# Patient Record
Sex: FEMALE | Race: WHITE | NOT HISPANIC OR LATINO | URBAN - METROPOLITAN AREA
[De-identification: names, ages, dates, MRNs, and addresses within clinical notes are randomized per-mention and may not be internally consistent; named-entity substitution may affect disease eponyms.]

---

## 2017-04-18 ENCOUNTER — OFFICE VISIT - HEALTHEAST (OUTPATIENT)
Dept: FAMILY MEDICINE | Facility: CLINIC | Age: 27
End: 2017-04-18

## 2017-04-18 DIAGNOSIS — E05.00 GRAVES DISEASE: ICD-10-CM

## 2017-04-18 DIAGNOSIS — Z76.89 ESTABLISHING CARE WITH NEW DOCTOR, ENCOUNTER FOR: ICD-10-CM

## 2017-04-18 DIAGNOSIS — M62.89 PELVIC FLOOR DYSFUNCTION: ICD-10-CM

## 2017-04-18 DIAGNOSIS — Z97.5 IUD (INTRAUTERINE DEVICE) IN PLACE: ICD-10-CM

## 2017-04-18 DIAGNOSIS — R23.3 EASY BRUISING: ICD-10-CM

## 2017-04-18 DIAGNOSIS — N89.8 VAGINAL DISCHARGE: ICD-10-CM

## 2017-04-18 DIAGNOSIS — E05.90 HYPERTHYROIDISM: ICD-10-CM

## 2017-04-18 DIAGNOSIS — Z01.419 WELL WOMAN EXAM: ICD-10-CM

## 2017-04-18 DIAGNOSIS — N92.0 MENORRHAGIA DUE TO INTRAUTERINE DEVICE (IUD) (H): ICD-10-CM

## 2017-04-18 DIAGNOSIS — T83.89XA MENORRHAGIA DUE TO INTRAUTERINE DEVICE (IUD) (H): ICD-10-CM

## 2017-04-18 ASSESSMENT — MIFFLIN-ST. JEOR: SCORE: 1283.08

## 2017-04-19 LAB
CHOLEST SERPL-MCNC: 229 MG/DL
HDLC SERPL-MCNC: 64 MG/DL
LDLC SERPL CALC-MCNC: 152 MG/DL
TRIGL SERPL-MCNC: 64 MG/DL

## 2017-04-20 ENCOUNTER — COMMUNICATION - HEALTHEAST (OUTPATIENT)
Dept: FAMILY MEDICINE | Facility: CLINIC | Age: 27
End: 2017-04-20

## 2017-04-20 ENCOUNTER — RECORDS - HEALTHEAST (OUTPATIENT)
Dept: ADMINISTRATIVE | Facility: OTHER | Age: 27
End: 2017-04-20

## 2017-04-20 DIAGNOSIS — T83.89XA MENORRHAGIA DUE TO INTRAUTERINE DEVICE (IUD) (H): ICD-10-CM

## 2017-04-20 DIAGNOSIS — N92.0 MENORRHAGIA DUE TO INTRAUTERINE DEVICE (IUD) (H): ICD-10-CM

## 2017-04-27 LAB
BKR LAB AP ABNORMAL BLEEDING: YES
BKR LAB AP BIRTH CONTROL/HORMONES: NORMAL
BKR LAB AP CERVICAL APPEARANCE: NORMAL
BKR LAB AP GYN ADEQUACY: NORMAL
BKR LAB AP GYN INTERPRETATION: NORMAL
BKR LAB AP GYN OTHER FINDINGS: NORMAL
BKR LAB AP HPV REFLEX: NORMAL
BKR LAB AP LMP: NORMAL
BKR LAB AP PATIENT STATUS: NORMAL
BKR LAB AP PREVIOUS ABNORMAL: NORMAL
BKR LAB AP PREVIOUS NORMAL: NORMAL
HIGH RISK?: NO
PATH REPORT.COMMENTS IMP SPEC: NORMAL
RESULT FLAG (HE HISTORICAL CONVERSION): NORMAL

## 2017-04-28 ENCOUNTER — COMMUNICATION - HEALTHEAST (OUTPATIENT)
Dept: FAMILY MEDICINE | Facility: CLINIC | Age: 27
End: 2017-04-28

## 2017-05-24 ENCOUNTER — HOSPITAL ENCOUNTER (OUTPATIENT)
Dept: ULTRASOUND IMAGING | Facility: CLINIC | Age: 27
Discharge: HOME | End: 2017-05-24
Attending: FAMILY MEDICINE

## 2017-05-24 DIAGNOSIS — N92.0 MENORRHAGIA DUE TO INTRAUTERINE DEVICE (IUD) (H): ICD-10-CM

## 2017-05-24 DIAGNOSIS — T83.89XA MENORRHAGIA DUE TO INTRAUTERINE DEVICE (IUD) (H): ICD-10-CM

## 2017-05-25 ENCOUNTER — COMMUNICATION - HEALTHEAST (OUTPATIENT)
Dept: FAMILY MEDICINE | Facility: CLINIC | Age: 27
End: 2017-05-25

## 2017-05-30 ENCOUNTER — COMMUNICATION - HEALTHEAST (OUTPATIENT)
Dept: FAMILY MEDICINE | Facility: CLINIC | Age: 27
End: 2017-05-30

## 2017-05-30 ENCOUNTER — OFFICE VISIT - HEALTHEAST (OUTPATIENT)
Dept: FAMILY MEDICINE | Facility: CLINIC | Age: 27
End: 2017-05-30

## 2017-05-30 DIAGNOSIS — Z30.9 CONTRACEPTION MANAGEMENT: ICD-10-CM

## 2017-05-30 DIAGNOSIS — D64.9 ANEMIA: ICD-10-CM

## 2017-05-30 DIAGNOSIS — Z30.432 ENCOUNTER FOR IUD REMOVAL: ICD-10-CM

## 2017-05-30 DIAGNOSIS — N92.0 MENORRHAGIA DUE TO INTRAUTERINE DEVICE (IUD) (H): ICD-10-CM

## 2017-05-30 DIAGNOSIS — T83.89XA MENORRHAGIA DUE TO INTRAUTERINE DEVICE (IUD) (H): ICD-10-CM

## 2017-07-27 ENCOUNTER — COMMUNICATION - HEALTHEAST (OUTPATIENT)
Dept: FAMILY MEDICINE | Facility: CLINIC | Age: 27
End: 2017-07-27

## 2017-07-27 DIAGNOSIS — E05.00 GRAVES DISEASE: ICD-10-CM

## 2017-07-28 ENCOUNTER — AMBULATORY - HEALTHEAST (OUTPATIENT)
Dept: LAB | Facility: CLINIC | Age: 27
End: 2017-07-28

## 2017-07-28 DIAGNOSIS — E05.00 GRAVES DISEASE: ICD-10-CM

## 2017-07-30 ENCOUNTER — COMMUNICATION - HEALTHEAST (OUTPATIENT)
Dept: FAMILY MEDICINE | Facility: CLINIC | Age: 27
End: 2017-07-30

## 2017-07-30 DIAGNOSIS — R00.2 HEART PALPITATIONS: ICD-10-CM

## 2017-07-30 DIAGNOSIS — E05.00 GRAVES DISEASE: ICD-10-CM

## 2017-08-01 ENCOUNTER — COMMUNICATION - HEALTHEAST (OUTPATIENT)
Dept: ENDOCRINOLOGY | Facility: CLINIC | Age: 27
End: 2017-08-01

## 2017-08-01 ENCOUNTER — AMBULATORY - HEALTHEAST (OUTPATIENT)
Dept: ENDOCRINOLOGY | Facility: CLINIC | Age: 27
End: 2017-08-01

## 2017-08-01 DIAGNOSIS — E05.00 GRAVES DISEASE: ICD-10-CM

## 2017-08-04 ENCOUNTER — COMMUNICATION - HEALTHEAST (OUTPATIENT)
Dept: FAMILY MEDICINE | Facility: CLINIC | Age: 27
End: 2017-08-04

## 2017-08-10 ENCOUNTER — COMMUNICATION - HEALTHEAST (OUTPATIENT)
Dept: HEALTH INFORMATION MANAGEMENT | Facility: CLINIC | Age: 27
End: 2017-08-10

## 2017-10-02 ENCOUNTER — AMBULATORY - HEALTHEAST (OUTPATIENT)
Dept: NURSING | Facility: CLINIC | Age: 27
End: 2017-10-02

## 2017-10-15 ENCOUNTER — COMMUNICATION - HEALTHEAST (OUTPATIENT)
Dept: FAMILY MEDICINE | Facility: CLINIC | Age: 27
End: 2017-10-15

## 2017-12-04 ENCOUNTER — COMMUNICATION - HEALTHEAST (OUTPATIENT)
Dept: ENDOCRINOLOGY | Facility: CLINIC | Age: 27
End: 2017-12-04

## 2017-12-04 ENCOUNTER — OFFICE VISIT - HEALTHEAST (OUTPATIENT)
Dept: ENDOCRINOLOGY | Facility: CLINIC | Age: 27
End: 2017-12-04

## 2017-12-04 DIAGNOSIS — E05.90 HYPERTHYROIDISM: ICD-10-CM

## 2017-12-04 LAB
CREAT SERPL-MCNC: 0.79 MG/DL (ref 0.6–1.1)
GFR SERPL CREATININE-BSD FRML MDRD: >60 ML/MIN/1.73M2

## 2017-12-04 ASSESSMENT — MIFFLIN-ST. JEOR: SCORE: 1332.98

## 2017-12-05 ENCOUNTER — COMMUNICATION - HEALTHEAST (OUTPATIENT)
Dept: ENDOCRINOLOGY | Facility: CLINIC | Age: 27
End: 2017-12-05

## 2017-12-08 ENCOUNTER — HOSPITAL ENCOUNTER (OUTPATIENT)
Dept: ULTRASOUND IMAGING | Facility: CLINIC | Age: 27
Discharge: HOME | End: 2017-12-08
Attending: INTERNAL MEDICINE

## 2017-12-08 DIAGNOSIS — E05.90 HYPERTHYROIDISM: ICD-10-CM

## 2018-01-04 ENCOUNTER — HOSPITAL ENCOUNTER (OUTPATIENT)
Dept: NUCLEAR MEDICINE | Facility: CLINIC | Age: 28
Discharge: HOME | End: 2018-01-04
Attending: INTERNAL MEDICINE

## 2018-01-04 DIAGNOSIS — E05.90 HYPERTHYROIDISM: ICD-10-CM

## 2018-01-05 ENCOUNTER — HOSPITAL ENCOUNTER (OUTPATIENT)
Dept: NUCLEAR MEDICINE | Facility: CLINIC | Age: 28
Discharge: HOME | End: 2018-01-05
Attending: INTERNAL MEDICINE

## 2018-01-05 ENCOUNTER — AMBULATORY - HEALTHEAST (OUTPATIENT)
Dept: LAB | Facility: CLINIC | Age: 28
End: 2018-01-05

## 2018-01-05 DIAGNOSIS — E05.90 HYPERTHYROIDISM: ICD-10-CM

## 2018-01-05 LAB
HCG UR QL: NEGATIVE
SP GR UR STRIP: 1 (ref 1–1.03)

## 2018-05-14 ENCOUNTER — COMMUNICATION - HEALTHEAST (OUTPATIENT)
Dept: LAB | Facility: CLINIC | Age: 28
End: 2018-05-14

## 2018-05-14 ENCOUNTER — AMBULATORY - HEALTHEAST (OUTPATIENT)
Dept: ENDOCRINOLOGY | Facility: CLINIC | Age: 28
End: 2018-05-14

## 2018-05-14 DIAGNOSIS — E05.00 GRAVES DISEASE: ICD-10-CM

## 2018-05-17 ENCOUNTER — AMBULATORY - HEALTHEAST (OUTPATIENT)
Dept: LAB | Facility: CLINIC | Age: 28
End: 2018-05-17

## 2018-05-17 ENCOUNTER — COMMUNICATION - HEALTHEAST (OUTPATIENT)
Dept: FAMILY MEDICINE | Facility: CLINIC | Age: 28
End: 2018-05-17

## 2018-05-17 ENCOUNTER — COMMUNICATION - HEALTHEAST (OUTPATIENT)
Dept: SCHEDULING | Facility: CLINIC | Age: 28
End: 2018-05-17

## 2018-05-17 DIAGNOSIS — E03.9 HYPOTHYROIDISM: ICD-10-CM

## 2018-05-17 DIAGNOSIS — E05.00 GRAVES DISEASE: ICD-10-CM

## 2018-05-17 LAB
T3 SERPL-MCNC: <25 NG/DL (ref 45–175)
T4 FREE SERPL-MCNC: <0.4 NG/DL (ref 0.7–1.8)
TSH SERPL DL<=0.005 MIU/L-ACNC: 74.6 UIU/ML (ref 0.3–5)

## 2018-05-18 ENCOUNTER — COMMUNICATION - HEALTHEAST (OUTPATIENT)
Dept: ENDOCRINOLOGY | Facility: CLINIC | Age: 28
End: 2018-05-18

## 2018-05-21 ENCOUNTER — COMMUNICATION - HEALTHEAST (OUTPATIENT)
Dept: ENDOCRINOLOGY | Facility: CLINIC | Age: 28
End: 2018-05-21

## 2018-05-29 ENCOUNTER — COMMUNICATION - HEALTHEAST (OUTPATIENT)
Dept: FAMILY MEDICINE | Facility: CLINIC | Age: 28
End: 2018-05-29

## 2018-05-29 ENCOUNTER — COMMUNICATION - HEALTHEAST (OUTPATIENT)
Dept: SCHEDULING | Facility: CLINIC | Age: 28
End: 2018-05-29

## 2018-06-04 ENCOUNTER — OFFICE VISIT - HEALTHEAST (OUTPATIENT)
Dept: ENDOCRINOLOGY | Facility: CLINIC | Age: 28
End: 2018-06-04

## 2018-06-04 DIAGNOSIS — E05.00 GRAVES DISEASE: ICD-10-CM

## 2018-06-04 ASSESSMENT — MIFFLIN-ST. JEOR: SCORE: 1347.04

## 2018-07-10 ENCOUNTER — COMMUNICATION - HEALTHEAST (OUTPATIENT)
Dept: FAMILY MEDICINE | Facility: CLINIC | Age: 28
End: 2018-07-10

## 2018-08-20 ENCOUNTER — AMBULATORY - HEALTHEAST (OUTPATIENT)
Dept: LAB | Facility: CLINIC | Age: 28
End: 2018-08-20

## 2018-08-20 ENCOUNTER — COMMUNICATION - HEALTHEAST (OUTPATIENT)
Dept: LAB | Facility: CLINIC | Age: 28
End: 2018-08-20

## 2018-08-20 DIAGNOSIS — E05.00 GRAVES DISEASE: ICD-10-CM

## 2018-08-20 LAB
T4 FREE SERPL-MCNC: 1.3 NG/DL (ref 0.7–1.8)
TSH SERPL DL<=0.005 MIU/L-ACNC: 2.6 UIU/ML (ref 0.3–5)

## 2018-09-17 ENCOUNTER — AMBULATORY - HEALTHEAST (OUTPATIENT)
Dept: NURSING | Facility: CLINIC | Age: 28
End: 2018-09-17

## 2018-11-24 ENCOUNTER — COMMUNICATION - HEALTHEAST (OUTPATIENT)
Dept: ENDOCRINOLOGY | Facility: CLINIC | Age: 28
End: 2018-11-24

## 2018-11-24 DIAGNOSIS — E05.00 GRAVES DISEASE: ICD-10-CM

## 2018-12-04 ENCOUNTER — COMMUNICATION - HEALTHEAST (OUTPATIENT)
Dept: ENDOCRINOLOGY | Facility: CLINIC | Age: 28
End: 2018-12-04

## 2018-12-04 DIAGNOSIS — E05.00 GRAVES DISEASE: ICD-10-CM

## 2018-12-18 ENCOUNTER — COMMUNICATION - HEALTHEAST (OUTPATIENT)
Dept: ENDOCRINOLOGY | Facility: CLINIC | Age: 28
End: 2018-12-18

## 2018-12-18 DIAGNOSIS — E05.00 GRAVES DISEASE: ICD-10-CM

## 2018-12-30 ENCOUNTER — COMMUNICATION - HEALTHEAST (OUTPATIENT)
Dept: TELEHEALTH | Facility: CLINIC | Age: 28
End: 2018-12-30

## 2018-12-31 ENCOUNTER — AMBULATORY - HEALTHEAST (OUTPATIENT)
Dept: ENDOCRINOLOGY | Facility: CLINIC | Age: 28
End: 2018-12-31

## 2018-12-31 DIAGNOSIS — E05.00 GRAVES DISEASE: ICD-10-CM

## 2019-01-01 ENCOUNTER — COMMUNICATION - HEALTHEAST (OUTPATIENT)
Dept: SCHEDULING | Facility: CLINIC | Age: 29
End: 2019-01-01

## 2019-01-02 ENCOUNTER — OFFICE VISIT - HEALTHEAST (OUTPATIENT)
Dept: FAMILY MEDICINE | Facility: CLINIC | Age: 29
End: 2019-01-02

## 2019-01-02 DIAGNOSIS — L98.9 SKIN LESION: ICD-10-CM

## 2019-01-02 DIAGNOSIS — L50.1 IDIOPATHIC URTICARIA: ICD-10-CM

## 2019-01-02 DIAGNOSIS — Z00.00 ENCOUNTER FOR PREVENTIVE CARE: ICD-10-CM

## 2019-01-02 DIAGNOSIS — E05.00 GRAVES DISEASE: ICD-10-CM

## 2019-01-02 LAB
T4 FREE SERPL-MCNC: 1.1 NG/DL (ref 0.7–1.8)
TSH SERPL DL<=0.005 MIU/L-ACNC: 12.28 UIU/ML (ref 0.3–5)

## 2019-01-02 ASSESSMENT — MIFFLIN-ST. JEOR: SCORE: 1346.58

## 2019-01-02 NOTE — ASSESSMENT & PLAN NOTE
She tells me that she had her thyroid ablated and is now on thyroid hormone replacement therapy.  She is currently taking levothyroxine 125 mcg orally per day.  Her last TSH was normal.  She would like to follow with me as long as her thyroid levels are staying normal.  She had seen Dr. Perez in the past but because his schedule is really difficult to get in-she would prefer to come here.    TSH is ordered today.

## 2019-01-02 NOTE — ASSESSMENT & PLAN NOTE
There is an 8 mm oval scaly lesion noted in the right groin fold.  At this point we discussed the option of biopsy versus watchful waiting.  She prefers to watch and wait and we can biopsy if it is not resolving or if it worsens.

## 2019-01-02 NOTE — ASSESSMENT & PLAN NOTE
Pathophysiology of idiopathic urticaria was discussed today.  We discussed that itching the skin will worsen this condition.  Also if the skin is dry this will worsen the condition.    Discussed the pathophysiology of eczema and importance of keeping the skin hydrated.    Step 1-change cleanser to a non-foaming cleanser such as Cetaphil or cerave    Step 2-apply Aquaphor ointment within 3 minutes of bathing and twice daily    Step 3- can use otc steroid cream for intermittent use if needed.    Step 4 - Claritin if needed. she was educated on mechanism of action and that it may take two weeks to see full effect due to circulating histamines.       Step 5 - medrol dose pack prescribed today due to irresistible pruritus described that the patient today.    If this is not resolving she can let me know and we can refer her to dermatology.

## 2019-01-02 NOTE — ASSESSMENT & PLAN NOTE
It was recommended today that she schedule an annual exam as she is overdue.  She also requested that she switch PCPs to me.

## 2019-01-03 ENCOUNTER — COMMUNICATION - HEALTHEAST (OUTPATIENT)
Dept: FAMILY MEDICINE | Facility: CLINIC | Age: 29
End: 2019-01-03

## 2019-01-03 DIAGNOSIS — E05.00 GRAVES DISEASE: ICD-10-CM

## 2019-01-05 ENCOUNTER — COMMUNICATION - HEALTHEAST (OUTPATIENT)
Dept: TELEHEALTH | Facility: CLINIC | Age: 29
End: 2019-01-05

## 2019-01-06 ENCOUNTER — COMMUNICATION - HEALTHEAST (OUTPATIENT)
Dept: FAMILY MEDICINE | Facility: CLINIC | Age: 29
End: 2019-01-06

## 2019-01-08 ENCOUNTER — OFFICE VISIT - HEALTHEAST (OUTPATIENT)
Dept: FAMILY MEDICINE | Facility: CLINIC | Age: 29
End: 2019-01-08

## 2019-01-08 DIAGNOSIS — L50.1 IDIOPATHIC URTICARIA: ICD-10-CM

## 2019-01-08 DIAGNOSIS — E05.00 GRAVES DISEASE: ICD-10-CM

## 2019-01-08 NOTE — ASSESSMENT & PLAN NOTE
The following was reiterated: Pathophysiology of idiopathic urticaria was discussed today.  We discussed that itching the skin will worsen this condition.  Also if the skin is dry this will worsen the condition.     Discussed the pathophysiology of eczema and importance of keeping the skin hydrated.  I do wonder whether her hypothyroidism is causing her skin to be drier than usual and more sensitive.     Step 1-change cleanser to a non-foaming cleanser such as Cetaphil or cerave     Step 2-apply Aquaphor ointment within 3 minutes of bathing and twice daily     Step 3- can use otc steroid cream for intermittent use if needed.     Step 4 - Claritin if needed. she was educated on mechanism of action and that it may take two weeks to see full effect due to circulating histamines.        Step 5 -oral steroids if needed     At this point she was also offered dermatology or allergy referral.  She says she did develop an allergy to apples suddenly some years ago and she wonders if maybe she is developed a new allergy.  She decided to defer dermatology referral and would like to see the allergist to have allergy testing done.    In the meantime Claritin 10 mg orally per day was prescribed she is asked to take it every day.  Once her hives are under control we can try going off the Claritin for a few days to see if the hives return.  It is possible she will need Claritin more chronically.

## 2019-01-08 NOTE — ASSESSMENT & PLAN NOTE
Dose of levothyroxine was recently increased from 125 mcg/day to 150 mcg/day and she is planning to have this rechecked 4-6 weeks fromthe time of the change.

## 2019-01-13 ENCOUNTER — COMMUNICATION - HEALTHEAST (OUTPATIENT)
Dept: FAMILY MEDICINE | Facility: CLINIC | Age: 29
End: 2019-01-13

## 2019-01-22 ENCOUNTER — OFFICE VISIT - HEALTHEAST (OUTPATIENT)
Dept: ALLERGY | Facility: CLINIC | Age: 29
End: 2019-01-22

## 2019-01-22 DIAGNOSIS — J30.1 SEASONAL ALLERGIC RHINITIS DUE TO POLLEN: ICD-10-CM

## 2019-01-22 DIAGNOSIS — T78.1XXA POLLEN-FOOD ALLERGY, INITIAL ENCOUNTER: ICD-10-CM

## 2019-01-22 DIAGNOSIS — L50.9 URTICARIA: ICD-10-CM

## 2019-01-22 ASSESSMENT — MIFFLIN-ST. JEOR: SCORE: 1346.58

## 2019-02-19 ENCOUNTER — OFFICE VISIT - HEALTHEAST (OUTPATIENT)
Dept: FAMILY MEDICINE | Facility: CLINIC | Age: 29
End: 2019-02-19

## 2019-02-19 DIAGNOSIS — E05.00 GRAVES DISEASE: ICD-10-CM

## 2019-02-19 DIAGNOSIS — Z30.44 ENCOUNTER FOR SURVEILLANCE OF VAGINAL RING HORMONAL CONTRACEPTIVE DEVICE: ICD-10-CM

## 2019-02-19 DIAGNOSIS — E78.2 MIXED HYPERLIPIDEMIA: ICD-10-CM

## 2019-02-19 DIAGNOSIS — Z13.0 SCREENING, ANEMIA, DEFICIENCY, IRON: ICD-10-CM

## 2019-02-19 DIAGNOSIS — Z23 NEED FOR PROPHYLACTIC VACCINATION WITH TETANUS-DIPHTHERIA (TD): ICD-10-CM

## 2019-02-19 DIAGNOSIS — Z00.00 PREVENTATIVE HEALTH CARE: ICD-10-CM

## 2019-02-19 DIAGNOSIS — Z13.29 SCREENING FOR ENDOCRINE DISORDER: ICD-10-CM

## 2019-02-19 DIAGNOSIS — B35.4 TINEA CORPORIS: ICD-10-CM

## 2019-02-19 DIAGNOSIS — Z13.228 ENCOUNTER FOR SCREENING FOR METABOLIC DISORDER: ICD-10-CM

## 2019-02-19 DIAGNOSIS — M62.89 PELVIC FLOOR DYSFUNCTION: ICD-10-CM

## 2019-02-19 LAB
BASOPHILS # BLD AUTO: 0 THOU/UL (ref 0–0.2)
BASOPHILS NFR BLD AUTO: 0 % (ref 0–2)
EOSINOPHIL # BLD AUTO: 0.2 THOU/UL (ref 0–0.4)
EOSINOPHIL NFR BLD AUTO: 5 % (ref 0–6)
ERYTHROCYTE [DISTWIDTH] IN BLOOD BY AUTOMATED COUNT: 10.6 % (ref 11–14.5)
HCT VFR BLD AUTO: 40.5 % (ref 35–47)
HGB BLD-MCNC: 13.6 G/DL (ref 12–16)
LYMPHOCYTES # BLD AUTO: 1 THOU/UL (ref 0.8–4.4)
LYMPHOCYTES NFR BLD AUTO: 28 % (ref 20–40)
MCH RBC QN AUTO: 31.2 PG (ref 27–34)
MCHC RBC AUTO-ENTMCNC: 33.5 G/DL (ref 32–36)
MCV RBC AUTO: 93 FL (ref 80–100)
MONOCYTES # BLD AUTO: 0.5 THOU/UL (ref 0–0.9)
MONOCYTES NFR BLD AUTO: 13 % (ref 2–10)
NEUTROPHILS # BLD AUTO: 1.9 THOU/UL (ref 2–7.7)
NEUTROPHILS NFR BLD AUTO: 54 % (ref 50–70)
PLATELET # BLD AUTO: 232 THOU/UL (ref 140–440)
PMV BLD AUTO: 8.8 FL (ref 7–10)
RBC # BLD AUTO: 4.35 MILL/UL (ref 3.8–5.4)
WBC: 3.5 THOU/UL (ref 4–11)

## 2019-02-19 ASSESSMENT — MIFFLIN-ST. JEOR: SCORE: 1337.51

## 2019-02-19 NOTE — ASSESSMENT & PLAN NOTE
Pain with intercourse, based on exam today, I think this may be due to scartissue from 4th degree laceration during child birth.  Gyn referral given.

## 2019-02-19 NOTE — ASSESSMENT & PLAN NOTE
Flu shot - recommended annually.   Pap: Normal 4/18/17-plan repeat in 2022  Mammo:  There is no family or personal history, not indicated    Colonoscopy:  There is no family or personal history, not indicated    Std testing desired:  offered  Osteoporosis prevention discussed.  vitamin d levels ordered. Recommend daily calcium and vitamin d intake to keep good bone health. Recommend weight bearing exercise, no tobacco, and limit alcohol  dexa - no indication.  Recommend sunscreen, exercise, & healthy diet.  Offered tsh, glucose, hgb, lipid  I have had an Advance Directives discussion with the patient.   Body mass index is 23.83 kg/m .   mychartactive.

## 2019-02-20 ENCOUNTER — COMMUNICATION - HEALTHEAST (OUTPATIENT)
Dept: FAMILY MEDICINE | Facility: CLINIC | Age: 29
End: 2019-02-20

## 2019-02-20 LAB
ALBUMIN SERPL-MCNC: 3.6 G/DL (ref 3.5–5)
ALP SERPL-CCNC: 57 U/L (ref 45–120)
ALT SERPL W P-5'-P-CCNC: 22 U/L (ref 0–45)
ANION GAP SERPL CALCULATED.3IONS-SCNC: 9 MMOL/L (ref 5–18)
AST SERPL W P-5'-P-CCNC: 20 U/L (ref 0–40)
BILIRUB SERPL-MCNC: 0.3 MG/DL (ref 0–1)
BUN SERPL-MCNC: 11 MG/DL (ref 8–22)
CALCIUM SERPL-MCNC: 9.2 MG/DL (ref 8.5–10.5)
CHLORIDE BLD-SCNC: 105 MMOL/L (ref 98–107)
CO2 SERPL-SCNC: 23 MMOL/L (ref 22–31)
CREAT SERPL-MCNC: 0.85 MG/DL (ref 0.6–1.1)
GFR SERPL CREATININE-BSD FRML MDRD: >60 ML/MIN/1.73M2
GLUCOSE BLD-MCNC: 75 MG/DL (ref 70–125)
POTASSIUM BLD-SCNC: 4.5 MMOL/L (ref 3.5–5)
PROT SERPL-MCNC: 7.1 G/DL (ref 6–8)
SODIUM SERPL-SCNC: 137 MMOL/L (ref 136–145)
TSH SERPL DL<=0.005 MIU/L-ACNC: 2.34 UIU/ML (ref 0.3–5)

## 2019-02-21 LAB
25(OH)D3 SERPL-MCNC: 21.1 NG/ML (ref 30–80)
25(OH)D3 SERPL-MCNC: 21.1 NG/ML (ref 30–80)

## 2019-02-26 ENCOUNTER — COMMUNICATION - HEALTHEAST (OUTPATIENT)
Dept: FAMILY MEDICINE | Facility: CLINIC | Age: 29
End: 2019-02-26

## 2019-03-03 ENCOUNTER — COMMUNICATION - HEALTHEAST (OUTPATIENT)
Dept: FAMILY MEDICINE | Facility: CLINIC | Age: 29
End: 2019-03-03

## 2019-03-03 DIAGNOSIS — E05.00 GRAVES DISEASE: ICD-10-CM

## 2019-03-07 ENCOUNTER — COMMUNICATION - HEALTHEAST (OUTPATIENT)
Dept: FAMILY MEDICINE | Facility: CLINIC | Age: 29
End: 2019-03-07

## 2019-03-07 DIAGNOSIS — E55.9 VITAMIN D DEFICIENCY: ICD-10-CM

## 2019-03-14 ENCOUNTER — COMMUNICATION - HEALTHEAST (OUTPATIENT)
Dept: FAMILY MEDICINE | Facility: CLINIC | Age: 29
End: 2019-03-14

## 2019-03-19 ENCOUNTER — COMMUNICATION - HEALTHEAST (OUTPATIENT)
Dept: FAMILY MEDICINE | Facility: CLINIC | Age: 29
End: 2019-03-19

## 2019-04-01 ENCOUNTER — COMMUNICATION - HEALTHEAST (OUTPATIENT)
Dept: FAMILY MEDICINE | Facility: CLINIC | Age: 29
End: 2019-04-01

## 2019-04-01 DIAGNOSIS — E05.00 GRAVES DISEASE: ICD-10-CM

## 2019-06-25 ENCOUNTER — COMMUNICATION - HEALTHEAST (OUTPATIENT)
Dept: FAMILY MEDICINE | Facility: CLINIC | Age: 29
End: 2019-06-25

## 2019-08-30 ENCOUNTER — COMMUNICATION - HEALTHEAST (OUTPATIENT)
Dept: FAMILY MEDICINE | Facility: CLINIC | Age: 29
End: 2019-08-30

## 2019-08-30 DIAGNOSIS — E05.00 GRAVES DISEASE: ICD-10-CM

## 2019-09-17 ENCOUNTER — OFFICE VISIT - HEALTHEAST (OUTPATIENT)
Dept: FAMILY MEDICINE | Facility: CLINIC | Age: 29
End: 2019-09-17

## 2019-09-17 DIAGNOSIS — M62.89 PELVIC FLOOR DYSFUNCTION: ICD-10-CM

## 2019-09-17 DIAGNOSIS — R23.3 EASY BRUISING: ICD-10-CM

## 2019-09-17 DIAGNOSIS — Z11.4 SCREENING FOR HIV WITHOUT PRESENCE OF RISK FACTORS: ICD-10-CM

## 2019-09-17 DIAGNOSIS — E05.00 GRAVES DISEASE: ICD-10-CM

## 2019-09-17 DIAGNOSIS — E78.2 MIXED HYPERLIPIDEMIA: ICD-10-CM

## 2019-09-17 DIAGNOSIS — Z31.69 PRE-CONCEPTION COUNSELING: ICD-10-CM

## 2019-09-17 DIAGNOSIS — Z23 IMMUNIZATION DUE: ICD-10-CM

## 2019-09-17 DIAGNOSIS — R79.9 ABNORMAL BLOOD CELL COUNT: ICD-10-CM

## 2019-09-17 DIAGNOSIS — E55.9 VITAMIN D DEFICIENCY: ICD-10-CM

## 2019-09-17 LAB
BASOPHILS # BLD AUTO: 0 THOU/UL (ref 0–0.2)
BASOPHILS NFR BLD AUTO: 0 % (ref 0–2)
CHOLEST SERPL-MCNC: 239 MG/DL
EOSINOPHIL # BLD AUTO: 0.2 THOU/UL (ref 0–0.4)
EOSINOPHIL NFR BLD AUTO: 3 % (ref 0–6)
ERYTHROCYTE [DISTWIDTH] IN BLOOD BY AUTOMATED COUNT: 11.1 % (ref 11–14.5)
FASTING STATUS PATIENT QL REPORTED: YES
HCT VFR BLD AUTO: 41.3 % (ref 35–47)
HDLC SERPL-MCNC: 67 MG/DL
HGB BLD-MCNC: 14.1 G/DL (ref 12–16)
HIV 1+2 AB+HIV1 P24 AG SERPL QL IA: NEGATIVE
LDLC SERPL CALC-MCNC: 154 MG/DL
LYMPHOCYTES # BLD AUTO: 1.3 THOU/UL (ref 0.8–4.4)
LYMPHOCYTES NFR BLD AUTO: 23 % (ref 20–40)
MCH RBC QN AUTO: 32.4 PG (ref 27–34)
MCHC RBC AUTO-ENTMCNC: 34.2 G/DL (ref 32–36)
MCV RBC AUTO: 95 FL (ref 80–100)
MONOCYTES # BLD AUTO: 0.3 THOU/UL (ref 0–0.9)
MONOCYTES NFR BLD AUTO: 6 % (ref 2–10)
NEUTROPHILS # BLD AUTO: 3.9 THOU/UL (ref 2–7.7)
NEUTROPHILS NFR BLD AUTO: 68 % (ref 50–70)
PLATELET # BLD AUTO: 203 THOU/UL (ref 140–440)
PMV BLD AUTO: 8.5 FL (ref 7–10)
RBC # BLD AUTO: 4.35 MILL/UL (ref 3.8–5.4)
TRIGL SERPL-MCNC: 92 MG/DL
TSH SERPL DL<=0.005 MIU/L-ACNC: 1.18 UIU/ML (ref 0.3–5)
WBC: 5.7 THOU/UL (ref 4–11)

## 2019-09-17 NOTE — ASSESSMENT & PLAN NOTE
TRYING TO CONCIEVE, NO CONTRACEPTION NOW   She says she stopped nuvaring.  She is offered preconception counseling and told to take prenatal vitamin.

## 2019-09-17 NOTE — ASSESSMENT & PLAN NOTE
RESOLVING  Cbc done today. All normal. Will watch and wait.  Given the history of scratching her leg and then suddenly developing burns I suspect she inadvertently injured a blood vessel.  I have asked her to be careful if this area in the future as there may be a blood vessel but it is fragile in that area.    Lab Results   Component Value Date    WBC 5.7 09/17/2019    HGB 14.1 09/17/2019    HCT 41.3 09/17/2019    MCV 95 09/17/2019     09/17/2019

## 2019-09-17 NOTE — ASSESSMENT & PLAN NOTE
NEEDS RECHECK  Last vit d was noted to be 21 2/2019.   Taking 5000 IU daily.   Will recheck.   If normal, recommend continue current supplement.     Addendum, vit d level is 62 -optimized. CONTROLLED on current supplement.

## 2019-09-18 LAB
25(OH)D3 SERPL-MCNC: 62.7 NG/ML (ref 30–80)
25(OH)D3 SERPL-MCNC: 62.7 NG/ML (ref 30–80)

## 2019-09-22 ENCOUNTER — COMMUNICATION - HEALTHEAST (OUTPATIENT)
Dept: FAMILY MEDICINE | Facility: CLINIC | Age: 29
End: 2019-09-22

## 2019-09-22 ENCOUNTER — COMMUNICATION - HEALTHEAST (OUTPATIENT)
Dept: SCHEDULING | Facility: CLINIC | Age: 29
End: 2019-09-22

## 2020-01-06 ENCOUNTER — COMMUNICATION - HEALTHEAST (OUTPATIENT)
Dept: FAMILY MEDICINE | Facility: CLINIC | Age: 30
End: 2020-01-06

## 2020-01-06 DIAGNOSIS — E05.00 GRAVES DISEASE: ICD-10-CM

## 2020-01-09 ENCOUNTER — TRANSCRIBE ORDERS (OUTPATIENT)
Dept: OTHER | Age: 30
End: 2020-01-09

## 2020-01-09 DIAGNOSIS — E05.00 GRAVES' DISEASE: Primary | ICD-10-CM

## 2020-01-13 ENCOUNTER — TELEPHONE (OUTPATIENT)
Dept: ENDOCRINOLOGY | Facility: CLINIC | Age: 30
End: 2020-01-13

## 2020-01-13 NOTE — TELEPHONE ENCOUNTER
[facility] Call Center    Phone Message    May a detailed message be left on voicemail: yes    Reason for Call: Other: Pt is referred by Dr Luly Martínez at [facility] to Endocrine for Graves Disease. Recs and referral faxed to clinic for review. Thanks!     Action Taken: Message routed to:  [facility] ([facility] Endocrine.

## 2020-01-17 NOTE — TELEPHONE ENCOUNTER
RECORDS RECEIVED FROM: Care everywhere    DATE RECEIVED: 1.29.20   NOTES (FOR ALL VISITS) STATUS DETAILS   OFFICE NOTES from referring provider Received 1/9/20 Luly Martínez   OFFICE NOTES from other specialist Care Everywhere 6/4/19 [facility]   ED NOTES N/A    OPERATIVE REPORT  (thyroid, pituitary, adrenal, parathyroid) N/A    MEDICATION LIST N/A    IMAGING      DEXASCAN N/A    MRI (BRAIN) N/A    XR (Chest) N/A    CT (HEAD/NECK/CHEST/ABDOMEN) N/A    NUCLEAR  Care Everywhere 1/5/18 [facility]    ULTRASOUND (HEAD/NECK) N/A    LABS     DIABETES: HBGA1C, CREATININE, FASTING LIPIDS, MICROALBUMIN URINE, POTASSIUM, TSH, T4    THYROID: TSH, T4, CBC, THYRODLONULIN, TOTAL T3, FREE T4, CALCITONIN, CEA Care Everywhere   T3-5/17/18  T4- 1/2/19  TSH- 9/17/19

## 2020-01-23 ENCOUNTER — AMBULATORY - HEALTHEAST (OUTPATIENT)
Dept: LAB | Facility: CLINIC | Age: 30
End: 2020-01-23

## 2020-01-23 DIAGNOSIS — E05.00 GRAVES DISEASE: ICD-10-CM

## 2020-01-23 LAB — TSH SERPL DL<=0.005 MIU/L-ACNC: 5.06 UIU/ML (ref 0.3–5)

## 2020-01-27 ENCOUNTER — COMMUNICATION - HEALTHEAST (OUTPATIENT)
Dept: FAMILY MEDICINE | Facility: CLINIC | Age: 30
End: 2020-01-27

## 2020-01-27 ENCOUNTER — AMBULATORY - HEALTHEAST (OUTPATIENT)
Dept: FAMILY MEDICINE | Facility: CLINIC | Age: 30
End: 2020-01-27

## 2020-01-27 DIAGNOSIS — E05.00 GRAVES DISEASE: ICD-10-CM

## 2020-01-29 ENCOUNTER — RECORDS - HEALTHEAST (OUTPATIENT)
Dept: ADMINISTRATIVE | Facility: OTHER | Age: 30
End: 2020-01-29

## 2020-01-29 ENCOUNTER — OFFICE VISIT (OUTPATIENT)
Dept: ENDOCRINOLOGY | Facility: CLINIC | Age: 30
End: 2020-01-29
Payer: COMMERCIAL

## 2020-01-29 ENCOUNTER — PRE VISIT (OUTPATIENT)
Dept: ENDOCRINOLOGY | Facility: CLINIC | Age: 30
End: 2020-01-29

## 2020-01-29 VITALS — WEIGHT: 149.2 LBS | HEART RATE: 75 BPM | DIASTOLIC BLOOD PRESSURE: 68 MMHG | SYSTOLIC BLOOD PRESSURE: 115 MMHG

## 2020-01-29 DIAGNOSIS — E89.0 POSTABLATIVE HYPOTHYROIDISM: Primary | ICD-10-CM

## 2020-01-29 DIAGNOSIS — Z3A.11 11 WEEKS GESTATION OF PREGNANCY: ICD-10-CM

## 2020-01-29 RX ORDER — LEVOTHYROXINE SODIUM 150 UG/1
150 TABLET ORAL DAILY
COMMUNITY
Start: 2020-01-26 | End: 2020-01-29

## 2020-01-29 RX ORDER — LEVOTHYROXINE SODIUM 200 UG/1
TABLET ORAL
Qty: 90 TABLET | Refills: 1 | Status: SHIPPED | OUTPATIENT
Start: 2020-01-29 | End: 2020-04-10

## 2020-01-29 SDOH — HEALTH STABILITY: MENTAL HEALTH: HOW OFTEN DO YOU HAVE A DRINK CONTAINING ALCOHOL?: NEVER

## 2020-01-29 ASSESSMENT — PAIN SCALES - GENERAL: PAINLEVEL: NO PAIN (0)

## 2020-01-29 NOTE — LETTER
1/29/2020       RE: Kenyetta Cordero  [address]  [place] [address]     Dear Colleague,    Thank you for referring your patient, Kenyetta Cordero, to the [facility] ENDOCRINOLOGY at [facility]. Please see a copy of my visit note below.    [facility]  Endocrinology  Yogi Edward MD  01/29/2020      Chief Complaint:   Consult     History of Present Illness:   Kenyetta Cordero is a 29 year old female with a history of vitamin D deficiency and Grave's disease who presents to [facility] care.     SHE IS 11 WEEKS PREGNANT.      She was diagnosed with Grave's disease in 2014 and has been on Tapazole before until she became pregnant. She took PTU for the first trimester and then discontinued and restarted Tapazole. 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 This was stopped in 09/2016 and she remained euthyroid until 07/2017 when she started to develop hyperthyroid symptoms.    She had ONEAL (20.1 mCi of iodine-131) on 01/05/2018 where uptake was abnormally elevated at 37.2%. After ablation she developed hypothyroidism where her TSH was 74.60, T4 free was <0.4 and T3 total was <25 in 05/2018. At that time, she was taking 88 mcg Levothyroixine and it was increased to 125 mcg. She was at this dose for some time until it was increased again on 01/2019 to 150 mcg after having a TSH of 12.28.     She is now pregnant. Reports fatigue, change in appetite and feeling of being cold. She does have nausea but believes this may be related to her pregnancy. Denies palpitations or shortness of breath. Also denies headache or change in vision. HER LAST TSH A WEEK AGO WAS HIGH AT 5.06.       Review of Systems:   Pertinent items are noted in HPI, remainder of complete ROS is negative.      Active Medications:      cholecalciferol (VITAMIN D3) 5000 units (125 mcg) capsule, Take 5,000 Units by mouth daily, Disp: , Rfl:      levothyroxine (SYNTHROID/LEVOTHROID) 150 MCG tablet,  Take 150 mcg by mouth daily, Disp: , Rfl:      Prenatal Vit-Fe Fumarate-FA (PRENATAL PO), , Disp: , Rfl:       Allergies:   Patient has no known allergies.      Past Medical History:  Grave's disease (Toxic diffuse goiter without mention of thyrotoxic crisis or storm)  Vitamin D deficiency   Chronic urticaria   Pelvic floor dysfunction      Past Surgical History:  History reviewed. No pertinent surgical history.    Family History:   History reviewed. No pertinent family history.      Social History:   The patient was alone   Smoking Status: never   Smokeless Tobacco: never    Alcohol Use: yes; 0-1 drinks per week    Marital Status:    Employment status: works in childcare   Home: 1 child 5 years      Physical Exam:   /68   Pulse 75   Wt 67.7 kg (149 lb 3.2 oz)    Constitutional: Pleasant no acute cardiopulmonary distress.   EYES: anicteric, normal extra-ocular movements, no lid lag or retraction, is equal and reactive to light bilaterally.  HEENT: Mouth/Throat: Mucous membrane is moist. Oropharynx is clear.  Thyroid examination: atrophic.   Cardiovascular: RRR, S1, S2 normal.   Pulmonary/Chest: CTAB. No wheezing or rales.   Abdominal: +BS. Non tender to palpation.    Neurological: Alert and oriented.  No tremor and reflexes are symmetrical bilaterally and within the normal limits. Muscle strength 5/5.   Extremities: No edema.  Psychological: appropriate mood and affect     Data:  Lipid Cascade (09/17/2019 11:35 AM CDT)  Component Value Ref Range   Cholesterol 239 (H) <=199 mg/dL   Triglycerides 92 <=149 mg/dL   HDL Cholesterol 67 >=50 mg/dL   LDL Calculated 154 (H) <=129 mg/dL   Patient Fasting > 8hrs? Yes      HM1 (CBC with Diff) (09/17/2019 11:35 AM CDT)  Component Value Ref Range   WBC 5.7 4.0 - 11.0 thou/uL   RBC 4.35 3.80 - 5.40 mill/uL   Hemoglobin 14.1 12.0 - 16.0 g/dL   Hematocrit 41.3 35.0 - 47.0 %   MCV 95 80 - 100 fL   MCH 32.4 27.0 - 34.0 pg   MCHC 34.2 32.0 - 36.0 g/dL   RDW 11.1 11.0 -  14.5 %   Platelets 203 140 - 440 thou/uL     Vitamin D, Total (25-Hydroxy) (09/17/2019 11:35 AM CDT)  Component Value Ref Range   Vitamin D, Total (25-Hydroxy) 62.7 30.0 - 80.0 ng/mL         NM THYROID UPTAKE AND SCAN 1/5/2018 9:12 AM  INDICATION: Clinical and laboratory evidence of hyperthyroidism.  TECHNIQUE: 0.295 millicuries I-123 was ingested by the patient, and 24-hour neck uptake and pinhole imaging performed.  COMPARISON: Thyroid ultrasound 12/8/2017.     FINDINGS: 24-hour uptake is 37.2% which is above the normal range of 10-30%. No focal lesion demonstrated.     IMPRESSION:   CONCLUSION:  1. Abnormally high thyroid uptake.    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  She was diagnosed with Grave's disease in 2014 and In 01/2018 she had ONEAL and developed post ablative hypothyroidism.on levothyroxine 150 mcg daily since 1/2019. Last TSH a week on this dose was above 5 (midly high)    With pregnancy; about 20% increase in levothyroxine dose usually indicated and also given her elevated TSH at 5; will adjust dose of levothyroxine.   Current dose = 1050 weekly. 20% =210; increase weekly dose to 1260. Which comes to 180 daily = which comes to about 200 mcg six days a week and half a tablet or 100 mcg one day a week. Will monitor blood work every month throughout preganacy.      Plan:    Blood work every month throughout pregnancy.     Increase current dose of thyroid medication to 200 mcg daily six days a week and 100 mcg 1 day a week.     Next blood work 2/28/20 and will adjust levothyroxine dose based on the results.     Discussed risk of hypo or hyper on pregnancy.      Follow-up: Return in about 3 months (around 4/29/2020).      Scribe Disclosure:  I, Charu Rai, am serving as a scribe to document services personally performed by Yogi Edward MD at this visit, based upon the provider's statements to me. All documentation has  been reviewed by the aforementioned provider prior to being entered into the official medical record.     Portions of this medical record were completed by a scribe. UPON MY REVIEW AND AUTHENTICATION BY ELECTRONIC SIGNATURE, this confirms (a) I performed the applicable clinical services, and (b) the record is accurate.        Yogi Edward MD  Staff Endocrinologist   Endocrinology and Metabolism  [facility]  Pager: [phone]

## 2020-01-29 NOTE — PATIENT INSTRUCTIONS
Blood work every month throughout pregnancy.   Increase your current dose of thyroid medication to 200 mcg daily six days a week and 100 mcg 1 day a week.   Next blood work 2/28/20 and will adjust levothyroxine dose based on the results.

## 2020-01-29 NOTE — PROGRESS NOTES
[facility]  Endocrinology  Yogi Edward MD  01/29/2020      Chief Complaint:   Consult     History of Present Illness:   Kenyetta Cordero is a 29 year old female with a history of vitamin D deficiency and Grave's disease who presents to [facility] care.     SHE IS 11 WEEKS PREGNANT.      She was diagnosed with Grave's disease in 2014 and has been on Tapazole before until she became pregnant. She took PTU for the first trimester and then discontinued and restarted Tapazole. 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 This was stopped in 09/2016 and she remained euthyroid until 07/2017 when she started to develop hyperthyroid symptoms.    She had ONEAL (20.1 mCi of iodine-131) on 01/05/2018 where uptake was abnormally elevated at 37.2%. After ablation she developed hypothyroidism where her TSH was 74.60, T4 free was <0.4 and T3 total was <25 in 05/2018. At that time, she was taking 88 mcg Levothyroixine and it was increased to 125 mcg. She was at this dose for some time until it was increased again on 01/2019 to 150 mcg after having a TSH of 12.28.     She is now pregnant. Reports fatigue, change in appetite and feeling of being cold. She does have nausea but believes this may be related to her pregnancy. Denies palpitations or shortness of breath. Also denies headache or change in vision. HER LAST TSH A WEEK AGO WAS HIGH AT 5.06.       Review of Systems:   Pertinent items are noted in HPI, remainder of complete ROS is negative.      Active Medications:      cholecalciferol (VITAMIN D3) 5000 units (125 mcg) capsule, Take 5,000 Units by mouth daily, Disp: , Rfl:      levothyroxine (SYNTHROID/LEVOTHROID) 150 MCG tablet, Take 150 mcg by mouth daily, Disp: , Rfl:      Prenatal Vit-Fe Fumarate-FA (PRENATAL PO), , Disp: , Rfl:       Allergies:   Patient has no known allergies.      Past Medical History:  Grave's disease (Toxic diffuse goiter without mention of thyrotoxic crisis or storm)  Vitamin D  deficiency   Chronic urticaria   Pelvic floor dysfunction      Past Surgical History:  History reviewed. No pertinent surgical history.    Family History:   History reviewed. No pertinent family history.      Social History:   The patient was alone   Smoking Status: never   Smokeless Tobacco: never    Alcohol Use: yes; 0-1 drinks per week    Marital Status:    Employment status: works in childcare   Home: 1 child 5 years      Physical Exam:   /68   Pulse 75   Wt 67.7 kg (149 lb 3.2 oz)    Constitutional: Pleasant no acute cardiopulmonary distress.   EYES: anicteric, normal extra-ocular movements, no lid lag or retraction, is equal and reactive to light bilaterally.  HEENT: Mouth/Throat: Mucous membrane is moist. Oropharynx is clear.  Thyroid examination: atrophic.   Cardiovascular: RRR, S1, S2 normal.   Pulmonary/Chest: CTAB. No wheezing or rales.   Abdominal: +BS. Non tender to palpation.    Neurological: Alert and oriented.  No tremor and reflexes are symmetrical bilaterally and within the normal limits. Muscle strength 5/5.   Extremities: No edema.  Psychological: appropriate mood and affect     Data:  Lipid Cascade (09/17/2019 11:35 AM CDT)  Component Value Ref Range   Cholesterol 239 (H) <=199 mg/dL   Triglycerides 92 <=149 mg/dL   HDL Cholesterol 67 >=50 mg/dL   LDL Calculated 154 (H) <=129 mg/dL   Patient Fasting > 8hrs? Yes      HM1 (CBC with Diff) (09/17/2019 11:35 AM CDT)  Component Value Ref Range   WBC 5.7 4.0 - 11.0 thou/uL   RBC 4.35 3.80 - 5.40 mill/uL   Hemoglobin 14.1 12.0 - 16.0 g/dL   Hematocrit 41.3 35.0 - 47.0 %   MCV 95 80 - 100 fL   MCH 32.4 27.0 - 34.0 pg   MCHC 34.2 32.0 - 36.0 g/dL   RDW 11.1 11.0 - 14.5 %   Platelets 203 140 - 440 thou/uL     Vitamin D, Total (25-Hydroxy) (09/17/2019 11:35 AM CDT)  Component Value Ref Range   Vitamin D, Total (25-Hydroxy) 62.7 30.0 - 80.0 ng/mL         NM THYROID UPTAKE AND SCAN 1/5/2018 9:12 AM  INDICATION: Clinical and laboratory evidence  of hyperthyroidism.  TECHNIQUE: 0.295 millicuries I-123 was ingested by the patient, and 24-hour neck uptake and pinhole imaging performed.  COMPARISON: Thyroid ultrasound 12/8/2017.     FINDINGS: 24-hour uptake is 37.2% which is above the normal range of 10-30%. No focal lesion demonstrated.     IMPRESSION:   CONCLUSION:  1. Abnormally high thyroid uptake.    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  She was diagnosed with Grave's disease in 2014 and In 01/2018 she had ONEAL and developed post ablative hypothyroidism.on levothyroxine 150 mcg daily since 1/2019. Last TSH a week on this dose was above 5 (midly high)    With pregnancy; about 20% increase in levothyroxine dose usually indicated and also given her elevated TSH at 5; will adjust dose of levothyroxine.   Current dose = 1050 weekly. 20% =210; increase weekly dose to 1260. Which comes to 180 daily = which comes to about 200 mcg six days a week and half a tablet or 100 mcg one day a week. Will monitor blood work every month throughout preganacy.      Plan:    Blood work every month throughout pregnancy.     Increase current dose of thyroid medication to 200 mcg daily six days a week and 100 mcg 1 day a week.     Next blood work 2/28/20 and will adjust levothyroxine dose based on the results.     Discussed risk of hypo or hyper on pregnancy.      Follow-up: Return in about 3 months (around 4/29/2020).      Scribe Disclosure:  I, Charu Rai, am serving as a scribe to document services personally performed by Yogi Edward MD at this visit, based upon the provider's statements to me. All documentation has been reviewed by the aforementioned provider prior to being entered into the official medical record.     Portions of this medical record were completed by a scribe. UPON MY REVIEW AND AUTHENTICATION BY ELECTRONIC SIGNATURE, this confirms (a) I performed the applicable clinical  services, and (b) the record is accurate.        Yogi Edward MD  Staff Endocrinologist   Endocrinology and Metabolism  [facility]  Pager: [phone]

## 2020-01-29 NOTE — NURSING NOTE
Chief Complaint   Patient presents with     Consult     Graves Disease     Ebonie Bettencourt MA

## 2020-04-10 ENCOUNTER — MYC MEDICAL ADVICE (OUTPATIENT)
Dept: ENDOCRINOLOGY | Facility: CLINIC | Age: 30
End: 2020-04-10

## 2020-04-10 DIAGNOSIS — E89.0 POSTABLATIVE HYPOTHYROIDISM: ICD-10-CM

## 2020-04-10 DIAGNOSIS — Z3A.21 21 WEEKS GESTATION OF PREGNANCY: Primary | ICD-10-CM

## 2020-04-10 DIAGNOSIS — Z3A.11 11 WEEKS GESTATION OF PREGNANCY: ICD-10-CM

## 2020-04-10 RX ORDER — LEVOTHYROXINE SODIUM 200 UG/1
TABLET ORAL
Qty: 100 TABLET | Refills: 1 | Status: SHIPPED | OUTPATIENT
Start: 2020-04-10 | End: 2020-05-06

## 2020-04-10 NOTE — TELEPHONE ENCOUNTER
"This writer called patient over the phone and discussed in detail regarding the message below.     \"on March 2nd? My TSH was 1.47 at that time. I have now had labs done today and my TSH is at 8.45.\"    This writer didn't receive the above results.     It was noted during our telephone conversation that patient was taking her thyroid medication with her prenatal vitamins over the last four - Five weeks; which is the likely cause of TSH elevation.     Plan:      Don't take levothyroxine with prenatal vitamin; separate at least by 4 hours. I to take prenatal recommend to take prenatal vitamins with dinner and keep thyroid medication in the morning.     Take thyroid medication on empty stomach and wait at least 30 minutes before eating.     Increase the dose of thyroid medication as follows: take 200 mcg tablet Mon-Sat and take 1.5 tablet (300 mcg) on Sunday's.     Check TSH in 4 weeks. Check TSH every month throughout pregnancy.     Please contact me with any change in symptoms.     Yogi Edward MD    "

## 2020-05-06 ENCOUNTER — MYC MEDICAL ADVICE (OUTPATIENT)
Dept: ENDOCRINOLOGY | Facility: CLINIC | Age: 30
End: 2020-05-06

## 2020-05-06 DIAGNOSIS — Z3A.11 11 WEEKS GESTATION OF PREGNANCY: ICD-10-CM

## 2020-05-06 DIAGNOSIS — E89.0 POSTABLATIVE HYPOTHYROIDISM: ICD-10-CM

## 2020-05-06 RX ORDER — LEVOTHYROXINE SODIUM 200 UG/1
200 TABLET ORAL DAILY
Qty: 90 TABLET | Refills: 1
Start: 2020-05-06 | End: 2020-06-17

## 2020-05-06 NOTE — TELEPHONE ENCOUNTER
"Called patient to discuss per [employer] message. Patient reported that at her [facility] office thyroid results were as follows:   TSH 0.1  Free T4 1.4 (normal range)  Current dose of levothyroxine is 1500 mcg weekly.     Recent communication documented below.   Patient currently asymptomatic and no hyperthyroid symptoms.     Plan: Recommended to reduce dose of levothyroxine to 200 mcg daily (1400 weekly) which is a 10% reduction.   advised to contact me if there is any change in her symptoms. Pt verbalized understanding.       Previous communication from 4/2019 copied below.   This writer called patient over the phone and discussed in detail regarding the message below.     \"on March 2nd? My TSH was 1.47 at that time. I have now had labs done today and my TSH is at 8.45.\"    This writer didn't receive the above results.     It was noted during our telephone conversation that patient was taking her thyroid medication with her prenatal vitamins over the last four - Five weeks; which is the likely cause of TSH elevation.     Plan:      Don't take levothyroxine with prenatal vitamin; separate at least by 4 hours. I to take prenatal recommend to take prenatal vitamins with dinner and keep thyroid medication in the morning.     Take thyroid medication on empty stomach and wait at least 30 minutes before eating.     Increase the dose of thyroid medication as follows: take 200 mcg tablet Mon-Sat and take 1.5 tablet (300 mcg) on Sunday's.     Check TSH in 4 weeks. Check TSH every month throughout pregnancy.     Please contact me with any change in symptoms.     Yogi Edward MD    "

## 2020-05-09 ENCOUNTER — COMMUNICATION - HEALTHEAST (OUTPATIENT)
Dept: FAMILY MEDICINE | Facility: CLINIC | Age: 30
End: 2020-05-09

## 2020-05-09 DIAGNOSIS — E55.9 VITAMIN D DEFICIENCY: ICD-10-CM

## 2020-05-19 NOTE — PROGRESS NOTES
"Kenyetta Cordero is a 30 year old female who is being evaluated via a billable video visit.      The patient has been notified of following:     \"This video visit will be conducted via a call between you and your physician/provider. We have found that certain health care needs can be provided without the need for an in-person physical exam.  This service lets us provide the care you need with a video conversation.  If a prescription is necessary we can send it directly to your pharmacy.  If lab work is needed we can place an order for that and you can then stop by our lab to have the test done at a later time.    Video visits are billed at different rates depending on your insurance coverage.  Please reach out to your insurance provider with any questions.    If during the course of the call the physician/provider feels a video visit is not appropriate, you will not be charged for this service.\"    Patient has given verbal consent for Video visit? Yes    How would you like to obtain your AVS? [place]    Patient would like the video invitation sent by: Send to e-mail at: [email or web address]    Will anyone else be joining your video visit? [facility] Video follow up  Endocrinology  Yogi Edward MD  May 20, 2020       Chief Complaint:   Hypothyroidism follow up while pregnant      History of Present Illness:   Kenyetta Cordero is a 30 year old female with a history of hypothyroidism secondary to radio-iodine ablation; currently pregnant.     SHE IS 27 WEEKS PREGNANT.      She was diagnosed with Grave's disease in 2014 and had ONEAL (20.1 mCi of iodine-131) on 01/05/2018.  Hypothyroidism following that.     She established care with us in January 2020 when she was pregnant 11 weeks and her TSH was 5.06 at the time.   Levothyroxine 200 daily 6 days a week and 100 one day a week.  TSH 1.47 in March 2020.  4/10/20 TSH 8.45 = she has been taking Levothyroxine with prenatals at the time.   Advised to " separate from prenatals and dose increased to Levothyroxine 200 6 days and 1 day 300  5/6/20 TSH 0.1 with normal T4 of 1.4 dose reduced to Levothyroxine 200 mcg daily   Today: she reports that HR 88 this morning. With activity can go up to 100. No tremor, heat intolerance. Has gained about 30 pounds so far.      Review of Systems:   Pertinent items are noted in HPI, remainder of complete ROS is negative.      Active Medications:       Current Outpatient Medications:      cholecalciferol (VITAMIN D3) 5000 units (125 mcg) capsule, Take 5,000 Units by mouth daily, Disp: , Rfl:      levothyroxine (SYNTHROID/LEVOTHROID) 200 MCG tablet, Take 1 tablet (200 mcg) by mouth daily, Disp: 90 tablet, Rfl: 1     Prenatal Vit-Fe Fumarate-FA (PRENATAL PO), , Disp: , Rfl:    Allergies:    No Known Allergies     Past Medical History:  Grave's disease (Toxic diffuse goiter without mention of thyrotoxic crisis or storm)  Vitamin D deficiency   Chronic urticaria   Pelvic floor dysfunction      Past Surgical History:  History reviewed. No pertinent surgical history.    Family History:   History reviewed. No pertinent family history.      Social History:   The patient is at home. Her whole family is working from home.   Smoking Status: never   Smokeless Tobacco: never    Alcohol Use: yes; 0-1 drinks per week    Marital Status:    Employment status: works in childcare   Home: 2 children     Physical Exam:   Constitutional: Pleasant no acute cardiopulmonary distress.   Neurological: Alert and oriented.   Psychological: appropriate mood and affect     Data:      Assessment and Plan:  Postablative hypothyroidism and 27 weeks gestation of pregnancy;   January 2020 when she was pregnant 11 weeks and her TSH was 5.06 at the time.   Levothyroxine increased to 200 daily 6 days a week and 100 one day a week (from 150 mcg daily)  TSH 1.47 in March 2020 on the above dose.   4/10/20 TSH 8.45 = she has been taking Levothyroxine with prenatals at  the time.   Levothyroxine  from prenatals and dose increased to Levothyroxine 200 6 days and 1 day 300  5/6/20 TSH 0.1 with normal T4 of 1.4 dose.  5/6/20 dose reduced to Levothyroxine 200 mcg daily   Today: euthyroid.  Has gained about 30 pounds so far with pregnancy.      Plan:    Blood work every month throughout pregnancy.     Continue current dose and we will touch based with her after next TSH check on 5/29/20     Follow-up: phone visit in 2 months.          Yogi Edward MD  Staff Endocrinologist   Endocrinology and Metabolism  [facility]  Pager: [phone]              Video-Visit Details    Type of service:  Video Visit = total time 10 minutes.       Originating Location (pt. Location): Home    Distant Location (provider location):   [facility]     Platform used for Video Visit: [id number]

## 2020-05-20 ENCOUNTER — VIRTUAL VISIT (OUTPATIENT)
Dept: ENDOCRINOLOGY | Facility: CLINIC | Age: 30
End: 2020-05-20
Payer: COMMERCIAL

## 2020-05-20 DIAGNOSIS — Z3A.27 27 WEEKS GESTATION OF PREGNANCY: ICD-10-CM

## 2020-05-20 DIAGNOSIS — E89.0 POSTABLATIVE HYPOTHYROIDISM: Primary | ICD-10-CM

## 2020-05-20 NOTE — PATIENT INSTRUCTIONS
It was a pleasure talking to you this morning Kenyetta.  Please continue current dose of levothyroxine 200 mcg daily.   I will talk to you after the next blood work on 5/29.   Take your medication on empty stomach and wait at least 30 minutes before eating. Separate oral administration of iron- or calcium-containing products and levothyroxine by at least 4 hours.

## 2020-05-27 ENCOUNTER — MYC MEDICAL ADVICE (OUTPATIENT)
Dept: ENDOCRINOLOGY | Facility: CLINIC | Age: 30
End: 2020-05-27

## 2020-05-27 DIAGNOSIS — E89.0 POSTABLATIVE HYPOTHYROIDISM: Primary | ICD-10-CM

## 2020-05-27 DIAGNOSIS — Z3A.30 30 WEEKS GESTATION OF PREGNANCY: ICD-10-CM

## 2020-05-27 DIAGNOSIS — Z86.39 HISTORY OF GRAVES' DISEASE: ICD-10-CM

## 2020-05-27 NOTE — TELEPHONE ENCOUNTER
Kenyetta,   This is a follow up to our telephone conversation.   Continue current dose of Levothyroxine at 200 mcg daily six days a week and take half a tablet -100 mcg one day a week.   TSH lags behind by at least six weeks and the current TSH doesn't reflect dose change made three weeks ago. The fact that the free T4 went down from 1.4 to 1.1 is reassuring.   We will check TSH again at your next blood draw on 6/22 (it will be six weeks from last dose adjustment at that time).   In the meantime, please let me know if you notice any change in your symptoms.   I have ordered TSI check with your next blood work (this is graves antibody).

## 2020-06-16 ENCOUNTER — TELEPHONE (OUTPATIENT)
Dept: ENDOCRINOLOGY | Facility: CLINIC | Age: 30
End: 2020-06-16

## 2020-06-16 NOTE — TELEPHONE ENCOUNTER
Thyroid labs due this week. Advised patient to do labs this week; if possible. She would do labs at her local clinic and will notify us once results are in.

## 2020-06-17 DIAGNOSIS — Z3A.11 11 WEEKS GESTATION OF PREGNANCY: ICD-10-CM

## 2020-06-17 DIAGNOSIS — E89.0 POSTABLATIVE HYPOTHYROIDISM: ICD-10-CM

## 2020-06-17 RX ORDER — LEVOTHYROXINE SODIUM 175 UG/1
175 TABLET ORAL DAILY
Qty: 90 TABLET | Refills: 0 | Status: SHIPPED | OUTPATIENT
Start: 2020-06-17 | End: 2020-10-19

## 2020-06-17 NOTE — PROGRESS NOTES
Currently taking Levothyroxine 1300 mcg weekly (200 six days a week and 100 one day a week).       Will reduce by about 5%. Which will be about 1250 mcg.   Nearest dose will be 175 mcg daily. Prescription sent to pharmacy.   Patient informed via 5 Minutes.   Called and informed her via telephone as well.

## 2020-07-27 ENCOUNTER — MYC MEDICAL ADVICE (OUTPATIENT)
Dept: ENDOCRINOLOGY | Facility: CLINIC | Age: 30
End: 2020-07-27

## 2020-08-02 ENCOUNTER — OFFICE VISIT - HEALTHEAST (OUTPATIENT)
Dept: FAMILY MEDICINE | Facility: CLINIC | Age: 30
End: 2020-08-02

## 2020-08-02 DIAGNOSIS — Z3A.38 PREGNANCY WITH 38 COMPLETED WEEKS GESTATION: ICD-10-CM

## 2020-08-02 DIAGNOSIS — J02.9 ACUTE PHARYNGITIS, UNSPECIFIED ETIOLOGY: ICD-10-CM

## 2020-08-02 LAB — DEPRECATED S PYO AG THROAT QL EIA: NORMAL

## 2020-08-03 LAB — GROUP A STREP BY PCR: NORMAL

## 2020-09-29 ENCOUNTER — COMMUNICATION - HEALTHEAST (OUTPATIENT)
Dept: HEALTH INFORMATION MANAGEMENT | Facility: CLINIC | Age: 30
End: 2020-09-29

## 2020-10-16 ENCOUNTER — MYC MEDICAL ADVICE (OUTPATIENT)
Dept: ENDOCRINOLOGY | Facility: CLINIC | Age: 30
End: 2020-10-16

## 2020-10-16 DIAGNOSIS — Z3A.11 11 WEEKS GESTATION OF PREGNANCY: ICD-10-CM

## 2020-10-16 DIAGNOSIS — E89.0 POSTABLATIVE HYPOTHYROIDISM: ICD-10-CM

## 2020-10-19 RX ORDER — LEVOTHYROXINE SODIUM 150 UG/1
150 TABLET ORAL DAILY
Qty: 90 TABLET | Refills: 1
Start: 2020-10-19 | End: 2020-10-22

## 2020-11-30 ENCOUNTER — COMMUNICATION - HEALTHEAST (OUTPATIENT)
Dept: FAMILY MEDICINE | Facility: CLINIC | Age: 30
End: 2020-11-30

## 2020-11-30 DIAGNOSIS — E55.9 VITAMIN D DEFICIENCY: ICD-10-CM

## 2021-01-04 ENCOUNTER — HEALTH MAINTENANCE LETTER (OUTPATIENT)
Age: 31
End: 2021-01-04

## 2021-04-03 ENCOUNTER — MYC MEDICAL ADVICE (OUTPATIENT)
Dept: ENDOCRINOLOGY | Facility: CLINIC | Age: 31
End: 2021-04-03

## 2021-04-03 DIAGNOSIS — E89.0 POSTABLATIVE HYPOTHYROIDISM: Primary | ICD-10-CM

## 2021-04-05 ENCOUNTER — TELEPHONE (OUTPATIENT)
Dept: ENDOCRINOLOGY | Facility: CLINIC | Age: 31
End: 2021-04-05

## 2021-04-05 NOTE — TELEPHONE ENCOUNTER
Action Needed --  I've placed a hold that needs scheduling. Please see below.  Department: Endocrine RTN  Provider:Wagenr  Date/Time: 4/7/21 1 PM VIDEO   RFV: Post partum   Yana Montano RN on 4/5/2021 at 8:46 AM

## 2021-04-06 NOTE — TELEPHONE ENCOUNTER
Dr Edward,  She needs to have a visit past due. She can't get out of a meeting today at 1 PM for her visit is there anyway we could  Add her to the end of the day to make this happen? Otherwise she is past due requesting refills

## 2021-04-06 NOTE — PROGRESS NOTES
Kenyetta Cordero  is being evaluated via a billable video visit.      How would you like to obtain your AVS? [email or web address]  For the video visit, send the invitation by: tres  Will anyone else be joining your video visit? No

## 2021-04-07 ENCOUNTER — MYC MEDICAL ADVICE (OUTPATIENT)
Dept: ENDOCRINOLOGY | Facility: CLINIC | Age: 31
End: 2021-04-07

## 2021-04-07 ENCOUNTER — VIRTUAL VISIT (OUTPATIENT)
Dept: ENDOCRINOLOGY | Facility: CLINIC | Age: 31
End: 2021-04-07
Payer: COMMERCIAL

## 2021-04-07 ENCOUNTER — RECORDS - HEALTHEAST (OUTPATIENT)
Dept: ADMINISTRATIVE | Facility: OTHER | Age: 31
End: 2021-04-07

## 2021-04-07 DIAGNOSIS — E89.0 POSTABLATIVE HYPOTHYROIDISM: ICD-10-CM

## 2021-04-07 PROCEDURE — 99213 OFFICE O/P EST LOW 20 MIN: CPT | Mod: GT | Performed by: INTERNAL MEDICINE

## 2021-04-07 RX ORDER — LEVOTHYROXINE SODIUM 125 UG/1
125 TABLET ORAL DAILY
Qty: 90 TABLET | Refills: 3 | Status: SHIPPED | OUTPATIENT
Start: 2021-04-07 | End: 2021-09-13

## 2021-04-07 NOTE — PATIENT INSTRUCTIONS
Kenyetta.      Levothyroxine 125 mcg daily.  Take it first thing in the morning and wait at least 30 minutes before eating. Continue to separate iron or calcium-containing supplements at least 4 hours from levothyroxine.     Check thyroid blood work in 2 months.    Refill for one year is sent to pharmacy.

## 2021-04-07 NOTE — LETTER
4/7/2021       RE: Kenyetta Cordero  [address] [place] [address]     Dear Colleague,    Thank you for referring your patient, Kenyetta Cordero, to the [facility] [place] at [facility]. Please see a copy of my visit note below.    Kenyetta Cordero  is being evaluated via a billable video visit.      How would you like to obtain your AVS? Tres  For the video visit, send the invitation by: tres  Will anyone else be joining your video visit? No          [facility] Video follow up  Endocrinology  Yogi Edward MD  April 7, 2021     Chief Complaint:   Hypothyroidism follow up while pregnant      History of Present Illness:   Kenyetta Cordero is a 30 year old female with a history of hypothyroidism secondary to radio-iodine ablation.  Delivered her baby seven months ago.  Had symptoms of anxiety and postpartum depression following delivery which is well controlled on Zoloft.  She is asking how she should be taking Zoloft in addition to levothyroxine.  Also reports that she has not been consistent with levothyroxine dose and I would like to be going forward.    She was diagnosed with Grave's disease in 2014 and had ONEAL (20.1 mCi of iodine-131) on 01/05/2018.  Hypothyroidism following that.     No hyper or hypothyroid symptoms on review of system today.  Weight has been appropriate.  Currently breast-feeding.  No tremor, palpitation, hair or skin changes.  No change in bowel movement.    She was on 175 mcg daily of levothyroxine at the time of her delivery.  Based on blood work results in October 2020 dose was reduced to 125 mcg daily.  Weight-based calculated levothyroxine dose is equivalent to 125 mcg daily.    Review of Systems:   Pertinent items are noted in HPI, remainder of complete ROS is negative.      Active Medications:       Current Outpatient Medications:      cholecalciferol (VITAMIN D3) 5000 units (125 mcg)  capsule, Take 5,000 Units by mouth daily, Disp: , Rfl:      levothyroxine (SYNTHROID/LEVOTHROID) 150 MCG tablet, Take 1 tablet (150 mcg) by mouth daily Six days a week., Disp: 90 tablet, Rfl: 1     Prenatal Vit-Fe Fumarate-FA (PRENATAL PO), , Disp: , Rfl:    Allergies:    No Known Allergies     Past Medical History:  History of Grave's disease   Hypothyroidism  Vitamin D deficiency   Anxiety and postpartum depression    Past Surgical History:  History reviewed. No pertinent surgical history.    Family History:   History reviewed. No pertinent family history.      Social History:   The patient is at home.  She is with her daughter.  Smoking Status: never   Smokeless Tobacco: never    Marital Status:    Employment status: works in childcare      Physical Exam:   Constitutional: Pleasant no acute cardiopulmonary distress.   Neurological: Alert and oriented.   Psychological: appropriate mood and affect     Data:      Assessment and Plan:  Postablative hypothyroidism   Current weight 160 pounds/72 kgs  Weight-based calculated levothyroxine dose is about 115 mcg daily.  Current dose of levothyroxine 125 mcg daily however has not been taking it consistently.  She has been on this dose since October 2020.  Of note, Kenyetta has had a higher dose of levothyroxine at home and she has been breaking and using the medications she had at home.  Plan:    Levothyroxine 125 mcg daily.  Take it first thing in the morning and wait at least 30 minutes before eating. Continue to separate iron or calcium-containing supplements at least 4 hours from levothyroxine.     Check thyroid blood work in 2 months.    Refill for one year provided.      Follow-up: 12 months.          Yogi Edward MD  Staff Endocrinologist   Endocrinology and Metabolism  [facility]    Video-Visit Details    Type of service:  Video Visit =11 minutes.     Originating Location (pt. Location): Home    Distant Location (provider location):   [facility]     Platform used for Video Visit: Luca

## 2021-04-07 NOTE — PROGRESS NOTES
[facility] Video follow up  Endocrinology  Yogi Edward MD  April 7, 2021     Chief Complaint:   Hypothyroidism follow up while pregnant      History of Present Illness:   Kenyetta Cordero is a 30 year old female with a history of hypothyroidism secondary to radio-iodine ablation.  Delivered her baby seven months ago.  Had symptoms of anxiety and postpartum depression following delivery which is well controlled on Zoloft.  She is asking how she should be taking Zoloft in addition to levothyroxine.  Also reports that she has not been consistent with levothyroxine dose and I would like to be going forward.    She was diagnosed with Grave's disease in 2014 and had ONEAL (20.1 mCi of iodine-131) on 01/05/2018.  Hypothyroidism following that.     No hyper or hypothyroid symptoms on review of system today.  Weight has been appropriate.  Currently breast-feeding.  No tremor, palpitation, hair or skin changes.  No change in bowel movement.    She was on 175 mcg daily of levothyroxine at the time of her delivery.  Based on blood work results in October 2020 dose was reduced to 125 mcg daily.  Weight-based calculated levothyroxine dose is equivalent to 125 mcg daily.    Review of Systems:   Pertinent items are noted in HPI, remainder of complete ROS is negative.      Active Medications:       Current Outpatient Medications:      cholecalciferol (VITAMIN D3) 5000 units (125 mcg) capsule, Take 5,000 Units by mouth daily, Disp: , Rfl:      levothyroxine (SYNTHROID/LEVOTHROID) 150 MCG tablet, Take 1 tablet (150 mcg) by mouth daily Six days a week., Disp: 90 tablet, Rfl: 1     Prenatal Vit-Fe Fumarate-FA (PRENATAL PO), , Disp: , Rfl:    Allergies:    No Known Allergies     Past Medical History:  History of Grave's disease   Hypothyroidism  Vitamin D deficiency   Anxiety and postpartum depression    Past Surgical History:  History reviewed. No pertinent surgical history.    Family History:   History reviewed. No pertinent  family history.      Social History:   The patient is at home.  She is with her daughter.  Smoking Status: never   Smokeless Tobacco: never    Marital Status:    Employment status: works in childcare      Physical Exam:   Constitutional: Pleasant no acute cardiopulmonary distress.   Neurological: Alert and oriented.   Psychological: appropriate mood and affect     Data:      Assessment and Plan:  Postablative hypothyroidism   Current weight 160 pounds/72 kgs  Weight-based calculated levothyroxine dose is about 115 mcg daily.  Current dose of levothyroxine 125 mcg daily however has not been taking it consistently.  She has been on this dose since October 2020.  Of note, Kenyetta has had a higher dose of levothyroxine at home and she has been breaking and using the medications she had at home.  Plan:    Levothyroxine 125 mcg daily.  Take it first thing in the morning and wait at least 30 minutes before eating. Continue to separate iron or calcium-containing supplements at least 4 hours from levothyroxine.     Check thyroid blood work in 2 months.    Refill for one year provided.      Follow-up: 12 months.          Yogi Edward MD  Staff Endocrinologist   Endocrinology and Metabolism  [facility]    Video-Visit Details    Type of service:  Video Visit =11 minutes.     Originating Location (pt. Location): Home    Distant Location (provider location):  [facility]     Platform used for Video Visit: Luca

## 2021-04-12 ENCOUNTER — TELEPHONE (OUTPATIENT)
Dept: ENDOCRINOLOGY | Facility: CLINIC | Age: 31
End: 2021-04-12

## 2021-04-12 NOTE — TELEPHONE ENCOUNTER
Per Dr. Edward 4/7 checkout note:    Please fax thyroid blood work order to [facility]

## 2021-04-13 ENCOUNTER — OFFICE VISIT - HEALTHEAST (OUTPATIENT)
Dept: FAMILY MEDICINE | Facility: CLINIC | Age: 31
End: 2021-04-13

## 2021-04-13 ENCOUNTER — COMMUNICATION - HEALTHEAST (OUTPATIENT)
Dept: FAMILY MEDICINE | Facility: CLINIC | Age: 31
End: 2021-04-13

## 2021-04-13 DIAGNOSIS — R05.9 COUGH: ICD-10-CM

## 2021-04-13 DIAGNOSIS — K21.00 GASTROESOPHAGEAL REFLUX DISEASE WITH ESOPHAGITIS WITHOUT HEMORRHAGE: ICD-10-CM

## 2021-04-13 ASSESSMENT — MIFFLIN-ST. JEOR: SCORE: 1442.97

## 2021-04-13 NOTE — ASSESSMENT & PLAN NOTE
Nighttime cough   Ddx: Gerd vs. pnd vs Asthma  Suspect gerd.  Weight gain noted related to pregnancy (excess weight is known to worsen gerd), patient noted epigastric discomfort (also consistent with gerd), noted gerd in evenings so would make sense that this would worsen after laying down. Has not tried meds.      Recommend trial prilosec 30 min beforedinner since its a problem at night. If that is not successful could try Trial albuterol at night.  If successful with prilosec, but ongoing sx beyond 2 months, recommend egd.

## 2021-04-27 ENCOUNTER — COMMUNICATION - HEALTHEAST (OUTPATIENT)
Dept: FAMILY MEDICINE | Facility: CLINIC | Age: 31
End: 2021-04-27

## 2021-05-12 ENCOUNTER — OFFICE VISIT - HEALTHEAST (OUTPATIENT)
Dept: FAMILY MEDICINE | Facility: CLINIC | Age: 31
End: 2021-05-12

## 2021-05-12 ENCOUNTER — RECORDS - HEALTHEAST (OUTPATIENT)
Dept: GENERAL RADIOLOGY | Facility: CLINIC | Age: 31
End: 2021-05-12

## 2021-05-12 DIAGNOSIS — M25.531 RIGHT WRIST PAIN: ICD-10-CM

## 2021-05-12 DIAGNOSIS — M25.531 PAIN IN RIGHT WRIST: ICD-10-CM

## 2021-05-12 NOTE — ASSESSMENT & PLAN NOTE
Plan - She is right hand dominant  Notes worsening right wrist pain/ rom and function over past 5 years.  Discussed physical therpay referral vs ortho referral.   Decided to start with xray right wrist  And get ortho referral.

## 2021-05-23 ENCOUNTER — RECORDS - HEALTHEAST (OUTPATIENT)
Dept: ADMINISTRATIVE | Facility: OTHER | Age: 31
End: 2021-05-23

## 2021-05-27 VITALS — HEART RATE: 72 BPM | RESPIRATION RATE: 14 BRPM | DIASTOLIC BLOOD PRESSURE: 70 MMHG | SYSTOLIC BLOOD PRESSURE: 116 MMHG

## 2021-05-30 VITALS — WEIGHT: 129 LBS | HEIGHT: 65 IN | BODY MASS INDEX: 21.49 KG/M2

## 2021-05-31 VITALS — BODY MASS INDEX: 22.32 KG/M2 | WEIGHT: 132.06 LBS

## 2021-05-31 VITALS — BODY MASS INDEX: 23.32 KG/M2 | WEIGHT: 140 LBS | HEIGHT: 65 IN

## 2021-05-31 NOTE — TELEPHONE ENCOUNTER
Upcoming Appointment Question  When is the appointment: Today  What is your appointment for?: Lab - blood draw for vitamin D & thyroid  Who is your appointment scheduled with?: [facility]   What is your question/concern?: Patient is looking to get her labs rechecked.  States there should be orders in system for both, only see old vitamin D order.   Okay to leave a detailed message?: Yes

## 2021-06-01 VITALS — WEIGHT: 143.1 LBS | HEIGHT: 65 IN | BODY MASS INDEX: 23.84 KG/M2

## 2021-06-01 NOTE — PROGRESS NOTES
ASSESSMENT AND PLAN:      Problem List Items Addressed This Visit        Unprioritized    Graves disease    Relevant Orders    Thyroid Stimulating Hormone (TSH)    Pre-conception counseling     TRYING TO CONCIEVE, NO CONTRACEPTION NOW   She says she stopped nuvaring.  She is offered preconception counseling and told to take prenatal vitamin.         Relevant Medications    pnv #6-iron-FA-B12-calcium-D3 35 mg(d)/1.1 mg -600 unit (n) TbSQ    Easy bruising     RESOLVING  Cbc done today. All normal. Will watch and wait.  Given the history of scratching her leg and then suddenly developing burns I suspect she inadvertently injured a blood vessel.  I have asked her to be careful if this area in the future as there may be a blood vessel but it is fragile in that area.    Lab Results   Component Value Date    WBC 5.7 09/17/2019    HGB 14.1 09/17/2019    HCT 41.3 09/17/2019    MCV 95 09/17/2019     09/17/2019             Vitamin D deficiency - Primary     NEEDS RECHECK  Last vit d was noted to be 21 2/2019.   Taking 5000 IU daily.   Will recheck.   If normal, recommend continue current supplement.     Addendum, vit d level is 62 - optimized. CONTROLLED on current supplement.         Relevant Orders    Vitamin D, Total (25-Hydroxy) (Completed)    RESOLVED: Pelvic floor dysfunction     MUCH IMPROVED   She says this is better. She desires no further intervention at this time.            Other Visit Diagnoses     Abnormal blood cell count        Relevant Orders    HM1(CBC and Differential) (Completed)    HM1 (CBC with Diff) (Completed)    Immunization due        Relevant Orders    Tdap vaccine,  8yo or older,  IM (Completed)    Screening for HIV without presence of risk factors        Relevant Orders    HIV Antigen/Antibody Screening Cascade (Completed)    Mixed hyperlipidemia               Chief Complaint   Patient presents with     Spot on leg she would like looked at     no painful, been there for 3 months         HPI  Kenyetta Cordero is a 29 y.o. female tells me 3 months ago she reached down to scratch her leg and suddenly developed a rapidly expanding bruise.  There was even a little bit of a lump there for a while but the lump is gone now.  3 months later it is faded dramatically and is nearly gone but she is worried that it may be something was missed.  Notes from care everywhere reviewed.  She was seen at [facility] 5/26/2019 and they ended a lower extremity Doppler to look for DVT which was normal.    She also mentions that she was having pain with intercourse but that is much better now after she saw midwives.  She is gotten off of her NuvaRing and is hoping to get pregnant.    She had a bit of a stuffy nose over the last 2 weeks but it something she would have come in for.    Social History     Tobacco Use   Smoking Status Never Smoker   Smokeless Tobacco Never Used      Current Outpatient Medications on File Prior to Visit   Medication Sig Dispense Refill     cholecalciferol, vitamin D3, 5,000 unit capsule Take 1 capsule (5,000 Units total) by mouth daily. 90 capsule 3     levothyroxine (SYNTHROID, LEVOTHROID) 150 MCG tablet TAKE 1 TABLET(150 MCG) BY MOUTH DAILY 90 tablet 3     No current facility-administered medications on file prior to visit.       Allergies   Allergen Reactions     Birch      Other Environmental Allergy      Seasonal allergies.       Review of Systems   Constitutional: Negative.    HENT: Negative.    Eyes: Negative.    Respiratory: Negative.    Cardiovascular: Negative.    Gastrointestinal: Negative.    Endocrine: Negative.    Genitourinary: Negative.    Musculoskeletal: Negative.    Skin: Negative.    Neurological: Negative.    Hematological: Negative.    Psychiatric/Behavioral: Negative.         OBJECTIVE: /62 (Patient Site: Left Arm, Patient Position: Sitting, Cuff Size: Adult Regular)   Pulse 70   Wt 143 lb (64.9 kg)   BMI 24.17 kg/m     Physical Exam  Constitutional:        General: She is not in acute distress.     Appearance: She is well-developed.   HENT:      Head: Normocephalic and atraumatic.      Right Ear: Tympanic membrane, ear canal and external ear normal.      Left Ear: Tympanic membrane, ear canal and external ear normal.      Nose: Nose normal.      Mouth/Throat:      Mouth: Mucous membranes are moist.      Pharynx: Oropharynx is clear.   Eyes:      Conjunctiva/sclera: Conjunctivae normal.   Neck:      Musculoskeletal: Neck supple.   Cardiovascular:      Rate and Rhythm: Normal rate and regular rhythm.   Pulmonary:      Effort: Pulmonary effort is normal.   Musculoskeletal: Normal range of motion.   Skin:     General: Skin is warm and dry.      Comments: Very faded bruise on right inner leg measuring about softball size. No hematoma. Resolving nicely.   Neurological:      Mental Status: She is alert and oriented to person, place, and time.        Lab Results   Component Value Date    WBC 5.7 09/17/2019    HGB 14.1 09/17/2019    HCT 41.3 09/17/2019    MCV 95 09/17/2019     09/17/2019      Additional History from Old Records Summarized (2): yes  Decision to Obtain Records (1): yes  Radiology Tests Summarized or Ordered (1): yes  Labs Reviewed or Ordered (1): yes  Medicine Test Summarized or Ordered (1): yes  Independent Review of EKG or X-RAY(2 each): no    This note was created using Dragon dictation.  Please excuse any grammatical errors.

## 2021-06-02 VITALS — WEIGHT: 143 LBS | BODY MASS INDEX: 23.82 KG/M2 | HEIGHT: 65 IN

## 2021-06-02 VITALS — HEIGHT: 65 IN | WEIGHT: 143 LBS | BODY MASS INDEX: 23.82 KG/M2

## 2021-06-02 VITALS — BODY MASS INDEX: 23.49 KG/M2 | HEIGHT: 65 IN | WEIGHT: 141 LBS

## 2021-06-03 VITALS
SYSTOLIC BLOOD PRESSURE: 100 MMHG | BODY MASS INDEX: 24.17 KG/M2 | WEIGHT: 143 LBS | HEART RATE: 70 BPM | DIASTOLIC BLOOD PRESSURE: 62 MMHG

## 2021-06-04 VITALS
WEIGHT: 186 LBS | RESPIRATION RATE: 20 BRPM | DIASTOLIC BLOOD PRESSURE: 80 MMHG | SYSTOLIC BLOOD PRESSURE: 124 MMHG | HEART RATE: 98 BPM | OXYGEN SATURATION: 99 % | BODY MASS INDEX: 31.43 KG/M2 | TEMPERATURE: 98.1 F

## 2021-06-05 VITALS
OXYGEN SATURATION: 99 % | HEIGHT: 64 IN | SYSTOLIC BLOOD PRESSURE: 100 MMHG | BODY MASS INDEX: 28.34 KG/M2 | WEIGHT: 166 LBS | HEART RATE: 90 BPM | DIASTOLIC BLOOD PRESSURE: 60 MMHG

## 2021-06-08 NOTE — TELEPHONE ENCOUNTER
RN cannot approve Refill Request    RN can NOT refill this medication Protocol failed and NO refill given. Last office visit: 9/17/2019 Luly Martínez MD Last Physical: 2/19/2019 Last MTM visit: Visit date not found Last visit same specialty: 9/17/2019 Luly Martínez MD.  Next visit within 3 mo: Visit date not found  Next physical within 3 mo: Visit date not found      Jammie Pearce, [facility] Triage/Med Refill 5/9/2020    Requested Prescriptions   Pending Prescriptions Disp Refills     cholecalciferol, vitamin D3, 125 mcg (5,000 unit) capsule [Pharmacy Med Name: VITAMIN D3 5,000 IU (ALEISHA) CAP] 100 capsule 0     Sig: TAKE 1 CAPSULE BY MOUTH ONCE DAILY       There is no refill protocol information for this order

## 2021-06-10 NOTE — PROGRESS NOTES
IUD Removal Procedure Note    This 27 y.o. female presents today for removal of her IUD, which was inserted 1.5 months ago. Kenyetta reports she is desirous of an alternative form of birth control. She bled for 32 days in a row with the Paragard IUD; she is not bleeding today. She started taking the oral contraceptive, Aviane, on the fifth day of her period and it did not help with the bleeding. Her LMP started on 4/29/2017. She thinks she had 2 menstrual cycles that merged together. She feels okay now that the bleeding has stopped; last week, she felt out of sorts and increased fatigue. She had more fatigue with the bleeding but she felt fatigued before; she is taking an oral iron supplement now. She did not like the side effects of hormonal oral contraceptives in the past; she was more sensitive and had weight gain. She does not think she stayed on NuvaRing long enough to notice or experience any side effects. Her yeast infection has resolved. She does not know what to do for contraception moving forward; she has not taken pills consistently in the past. She had an ultrasound last week. She has used NuvaRing before which she liked and tolerated. She denies any heart palpitations or dizziness.     Procedure Details   Urine pregnancy test was not done. I discussed with the patient the potential risks and benefits of IUD removal. The alternatives forms of birth control were discussed with the patient.  The patient understands the procedure, has had ample time to ask questions. Verbal informed consent was obtained.      Patient was positioned and the IUD strings were visualized.  The strings were grasped with forceps and the IUD was gently removed.    Condition:  Stable    Complications:  None. Patient tolerated procedure well.    Plan:  The patient was advised to call for any fever or for prolonged or severe pain or bleeding.     1. Menorrhagia due to intrauterine device (IUD)/contraception management  -Copper IUD  removed today.  Starting on the NuvaRing.  Patient will let me know if she has any complications.  Double up with contraception for the first week or 2.  Reviewed ultrasound that did not show any abnormalities and IUD had been in place appropriately.  No pregnancy.  We did also discuss options to placing an IUD in the future containing progesterone if she does not like the ring    2. Anemia  -Has had fatigue and dizziness secondary to anemia from blood loss due to menstrual periods.  She was recommended that she take iron supplementation        The visit lasted a total of 9 minutes face to face with the patient. Over 50% of the time was spent counseling and educating the patient about contraception and removing her Paragard IUD.    I, Sheri Sal, am scribing for and in the presence of Dr. Blevins.  I, Dr. Shirley Blevins DO , personally performed the services described in this documentation as scribed by Sheri Sal in my presence, and it is both accurate and complete.

## 2021-06-10 NOTE — PROGRESS NOTES
ASSESSMENT & PLAN:  1. Well woman exam  Updating preventative maintenance labs including lipid Cascade, CMP, Pap smear testing although that had been done likely within the last year however need to wait for records.  - Lipid Cascade    2. Establishing care with new doctor, encounter for  -Reviewed entire health history.  Trying to obtain release of information for [facility] endocrinology as well as Dr. Mccurdy OB/[facility] who may be located in the same vicinity  - Gynecologic Cytology (PAP Smear)  - Lipid Cascade    3. Graves disease/. Hyperthyroidism  -History of Graves' disease preceding pregnancy but in close proximity.  Was treated with methimazole and has not been on medication now for the last 4 months.  Not having any symptoms of hypo-or hyperthyroidism other than heavy menstrual bleeding , easy bruising, and fatigue.  Does have the IUD ParaGard in at this time.  She has seen Endocrinology that is transferred clinics.  More recently she had only seen one physician over at [facility] endocrinology-thinks the name is Dr. Ovalle.  Discussion had been had about having thyroid ablation if her Graves' disease was causing her any hyperthyroidism.  She would be interested in this if that is the case.  Thyroid labs have been drawn as well as iron studies given the level of fatigue.  If labs are normal should consider reassessing at least every 4-6 months  - Comprehensive Metabolic Panel  - Thyroid Stimulating Hormone (TSH)  - T4, Free  - T3, Total  - HM2(CBC w/o Differential)  - Ferritin  - INR  - APTT(PTT)    4. Easy bruising  -See above.  INR, PTT, CBC assessed for any abnormality and clotting factors.  Could also be secondary to hypothyroidism typically.  Reassess thyroid levels.    5. IUD (intrauterine device) in place - Paragard/menorrhagia  -Having heavy menstrual bleeding which is not uncommon with regard.  We discussed  ruling out thyroid abnormalities and if normal then would treat with an oral  contraceptive medication for couple months to reset her cycle or using a 5-10 day course of Provera that does not work.    6. Perineal laceration/pelvic floor dysfunction  -Referral will be placed to physical therapy for pelvic floor dysfunction.  No abnormality seen on exam today.  History of episiotomy.  [facility] or  in private practice in [place]. Her name is Leslie Gutierrez  - Ambulatory referral to PT/OT    7. Vaginal discharge  -If any findings of yeast or BV patient would like to be treated  - Wet Prep, Vaginal      There are no Patient Instructions on file for this visit.     Orders Placed This Encounter   Procedures     Wet Prep, Vaginal     Comprehensive Metabolic Panel     Thyroid Stimulating Hormone (TSH)     T4, Free     T3, Total     HM2(CBC w/o Differential)     Ferritin     INR     APTT(PTT)     Lipid Cascade     Order Specific Question:   Fasting is required?     Answer:   Yes     Ambulatory referral to PT/OT     Referral Priority:   Routine     Referral Type:   Physical Therapy or Occupational Therapy     Referral Reason:   Evaluation and Treatment     Requested Specialty:   Physical Therapy     Number of Visits Requested:   1     There are no discontinued medications.    No Follow-up on file.     CHIEF COMPLAINT:  Chief Complaint   Patient presents with     Annual Exam     Menstrual Problem     Establish Care        HISTORY OF PRESENT ILLNESS:  Kenyetta is a 26 y.o. female presenting to the clinic today to establish care and for an annual exam. She was previously living in [place], [place] and [place], [place]. Her insurance covers [place].     Graves' Disease: She was diagnosed with Graves' Disease in September 2014, shortly before she became pregnant; after pregnancy, she stayed on her hyperthyroidism medication until she was done breastfeeding. She was having weight loss, increased heart rate, and frequent bowel movements before she was diagnosed; she knew about Graves'  Disease because her aunt had it. She was not having any hair loss or vision changes before she was diagnosed with Graves'. She did have a thyroid ultrasound and she denies any personal history of thyroid cancer. She was taking PTU during her first trimester of pregnancy and then she started taking methimazole until she was done breastfeeding her daughter. She was seen by endocrinology during her pregnancy, she had an US every week with neonatology after 32 weeks of pregnancy, and she was having twice weekly NST's with her Ob/Gyn. She notes her daughter is doing well. She has not had a thyroid ablation. She stopped breastfeeding in October 2016 and her hyperthyroid symptoms have not returned even though she has not been taking any medication. She moved to [place] in March 2016, and she saw an endocrinologist, Dr. Ovalle, with [facility] at [facility] once. She is aware Graves' is a lifelong disease but her labs were normal in [place]. Her ob/gyn mentioned that her hyperthyroidism might go away during pregnancy, and her endocrinologist also mentioned this. She thinks she would rather have an ablation and take levothyroxine than go back on methimazole if she still has Graves'. She notes being high risk in pregnancy was a lot of work. She is feeling a bit anxious because she is a mom. She denies any heart palpitations recently which she had severely when she was first diagnosed. She denies any known family history of bleeding or clotting disorders.    Menorrhagia: She had a copper IUD placed 3 months after her baby was born in September 2015 and her menstrual period started coming back when her baby was 9 months old. Since August 2016, her menstrual periods have been lasting for 9-10 days and they have been very heavy. She was using a Diva cup recently and she has been bleeding 80 mL and changing the cup every 4 hours. She denies any severe cramping. She was told she might have heavy menstrual bleeding for a  while after her Paragard was placed but that it would get better over time. She does not feel well at the end of her menstrual periods; she develops bruises easily and she feels drained. She was hoping the heavy periods would go away over time but they have not. She was anemic at the end of her pregnancy. On days 8-12 of her menstrual periods she feels very tired. Her periods are happen regularly every 28 days but they last so long she only has a short while between periods. She has had the copper IUD for over a year now, and she is not opposed to taking it out. She has been on other contraception in the past, but it is hard for her to remember to take a pill every day. She is not interested in having more children within the next few years.     Fourth Degree Perineal Tear: She has been pregnant once; she delivered her daughter on 9/10/2015 and her pregnancy and delivery were normal other than she had a fourth degree perineal tear and she was labeled a high risk pregnancy because of her hyperthyroidism. Her baby was posterior and she pushed for 4 hours before she started tearing; her doctor did an episiotomy but she unfortunately still had a fourth degree tear. She was seen by Dr. Cowart in [place] with [facility] and he did not do a full physical exam for her at her 6 month post-partum visit. She did pelvic floor therapy 4-5 times; she notes it was not her thing. She is having some trouble emptying her bladder recently. Her bladder empties more if she moves while urinating. [place] is also painful most of the time; it gets better with different positions, but the pain depends on the day.  She notes it is more difficult to have bowel movements and she has to strain more than she used to. Pelvic exams were painful for her at first but she thinks it should be better now.     Vaginal Discharge: She thinks she had a yeast infection recently but she is not sure. She is still having vaginal discomfort but it was  worse a week ago.     Health Maintenance: She is fasting today. She thinks her last Pap was done a year ago in [place]; she denies any history of abnormal Pap smears. Her daughter's name is Delisa and she is 18 months old now. She did not have difficulty breastfeeding.    REVIEW OF SYSTEMS:   She denies any headaches, LOC, protruding eyes, or changes to her breast tissue. All other systems are negative.     PFSH:  Her  tore his ACL and her daughter had tympanostomy tubes placed this year, so she has been putting her health on the back burner. Her mother has had some health issues but she is not sure what they are exactly.     History   Smoking Status     Never Smoker   Smokeless Tobacco     Not on file        Family History   Problem Relation Age of Onset     Graves' disease Maternal Aunt         Social History     Social History     Marital status:      Spouse name: N/A     Number of children: 1     Years of education: N/A     Occupational History           Social History Main Topics     Smoking status: Never Smoker     Smokeless tobacco: Not on file     Alcohol use 0.0 - 0.6 oz/week     0 - 1 Standard drinks or equivalent per week     Drug use: No     Sexual activity: Yes     Partners: Male     Birth control/ protection: IUD      Comment: Paragard     Other Topics Concern     Not on file     Social History Narrative     No narrative on file        Past Surgical History:   Procedure Laterality Date     CHOLECYSTECTOMY  03/25/2013        Allergies   Allergen Reactions     Apple Swelling     Swollen throat     Other Environmental Allergy      Seasonal allergies.        Active Ambulatory Problems     Diagnosis Date Noted     Graves disease 04/18/2017     IUD (intrauterine device) in place - Paragard 04/18/2017     Perineal laceration -4th degee previous pregnancy  04/18/2017     Pelvic floor dysfunction 04/18/2017     Menorrhagia due to intrauterine device (IUD) 04/20/2017  "    Resolved Ambulatory Problems     Diagnosis Date Noted     No Resolved Ambulatory Problems     Past Medical History:   Diagnosis Date     Fourth degree perineal laceration 09/2015     Graves disease 09/2014        VITALS:  Vitals:    04/18/17 1014   BP: 100/68   Pulse: 76   Resp: 16   Temp: 97.7  F (36.5  C)   TempSrc: Oral   Weight: 129 lb (58.5 kg)   Height: 5' 4.5\" (1.638 m)     Wt Readings from Last 3 Encounters:   04/18/17 129 lb (58.5 kg)     Body mass index is 21.8 kg/(m^2).  Patient's last menstrual period was 04/01/2017 (exact date).     PHYSICAL EXAM:  GENERAL:  Reveals an alert female in NAD.  Vitals:  Per nursing notes.  EYES: PERRLA. Extraocular movements intact. Normal conjunctiva and lids.    ENT:  Hearing grossly normal. Normal appearance to ears and nose. Bilateral TM s, external canals, oropharynx normal. Normal lips, gums and teeth. Normal nasal mucosa, septum and turbinates.  NECK:  Supple, without thyromegaly or mass.  LUNGS:  Clear to auscultation without crackles, wheezes or distress. Normal respiratory effort.   CV:  Regular rate and rhythm without murmurs, rubs or gallops. No varicosities or edema. Carotids without bruits.  ABDOMEN:  Soft, non-tender, without hepatosplenomegaly, masses, or hernias.   BREASTS:  Non-tender, without masses, nipple discharge, erythema, or axillary adenopathy.    :  Normal pelvic exam, including external genitalia with normal appearance and no lesions. Normal vaginal exam, including no discharge and normal pelvic support, and no lesions. Cervix well visualized, with normal appearance and no lesions or discharge.  Normal uterus, including normal size, shape, mobility with no tenderness. Normal adnexa, including no nodules, masses or tenderness.  LYMPH: Normal palpation of neck, groin and axilla. No lymphadenopathy. No bruising.  NEURO:  CN II-XII intact.  PSYCH:  Alert & oriented with normal mood and affect.   SKIN:  Normal inspection and palpation.  MSK: " Normal gait and station.        DATA REVIEWED:  ADDITIONAL HISTORY SUMMARIZED (2): None.  DECISION TO OBTAIN EXTRA INFORMATION (1): [facility]  RADIOLOGY TESTS (1): None.  LABS (1): Reviewed and ordered labs.  MEDICINE TESTS (1): None.  INDEPENDENT REVIEW (2 each): None.      The visit lasted a total of 37 minutes face to face with the patient. Over 50% of the time was spent counseling and educating the patient about her health history, family history, chronic health conditions, and health maintenance.     ISheri, am scribing for and in the presence of Dr. Blevins.  I, Dr. Blevins, personally performed the services described in this documentation, as scribed by Sheri Sal in my presence, and it is both accurate and complete.     MEDICATIONS:  No current outpatient prescriptions on file.     No current facility-administered medications for this visit.         Total data points: 2

## 2021-06-13 NOTE — TELEPHONE ENCOUNTER
RN cannot approve Refill Request    RN can NOT refill this medication med is not covered by policy/route to provider. Last office visit: 9/17/2019 Luly Martínez MD Last Physical: 2/19/2019 Last MTM visit: Visit date not found Last visit same specialty: 9/17/2019 Luly Martínez MD.  Next visit within 3 mo: Visit date not found  Next physical within 3 mo: Visit date not found      Sheri Leslie, [facility] Triage/Med Refill 12/1/2020    Requested Prescriptions   Pending Prescriptions Disp Refills     cholecalciferol, vitamin D3, 125 mcg (5,000 unit) capsule 100 capsule 0     Sig: Take 1 capsule (5,000 Units total) by mouth daily.       There is no refill protocol information for this order

## 2021-06-14 NOTE — PROGRESS NOTES
Progress Note    Reason for Visit:  Chief Complaint     Graves' Disease          Progress Note:    HPI:    This pleasant 27-year-old female patient seen in consultation at the request of the primary care physician because of hyperthyroidism due to Graves' disease.    The patient had hyperthyroidism for 3 years has been on Tapazole before and meanwhile she became pregnant and took PTU for the first trimester and then after that continued on Tapazole.    During nursing she was taking 5 mg of Tapazole.    This was stopped and the patient was euthyroid for 10 month then back in July she started to develop symptoms of hyperthyroidism with palpitation and anxiety nausea vomiting weight loss of 10 pounds hoarseness of voice.    The patient is  with 1 child 2 years old that her cycles are regular does not smoke or drink works in childcare.    Her aunt has Graves' disease.      Component      Latest Ref Rng & Units 4/18/2017 7/28/2017   Creatinine      0.60 - 1.10 mg/dL 0.78    GFR MDRD Af Amer      >60 mL/min/1.73m2 >60    GFR MDRD Non Af Amer      >60 mL/min/1.73m2 >60    Bilirubin, Total      0.0 - 1.0 mg/dL 0.4    Calcium      8.5 - 10.5 mg/dL 9.7    Protein, Total      6.0 - 8.0 g/dL 7.7    ALBUMIN      3.5 - 5.0 g/dL 4.1    Alkaline Phosphatase      45 - 120 U/L 62    AST      0 - 40 U/L 19    ALT      0 - 45 U/L 9    Cholesterol      <=199 mg/dL 229 (H)    Triglycerides      <=149 mg/dL 64    HDL Cholesterol      >=50 mg/dL 64    LDL Calculated      <=129 mg/dL 152 (H)    TSH      0.30 - 5.00 uIU/mL 0.71 <0.01 (L)   Free T4      0.7 - 1.8 ng/dL 1.1 3.7 (H)   T3, Total      45 - 175 ng/dL 115        Review of Systems:    Nervous System: No headache, dizziness, fainting or memory loss. No tingling sensation of hand or feet.  Ears: No hearing loss or ringing in the ears  Eyes: No blurring of vision, redness, itching or dryness.  Nose: No nosebleed or loss of smell  Mouth: No mouth sores or loss of taste  Throat:  No hoarseness or difficulty swallowing  Neck: No enlarged thyroid or lymph nodes.  Heart: No chest pain, palpitation or irregular heartbeat. No swelling of hands or feet  Lungs: No shortness of breath, cough, night sweats, wheezing or hemoptysis.  Gastrointestinal: No nausea or vomiting, constipation or diarrhea.  No acid reflux, abdominal pain or blood in stools.  Kidney/Bladdr: No polyuria, polydipsia, nocturia or hematuria.  Genital/Sexual: No loss of libido  Skin: No rash, hair loss or hirsutism.  No abnormal striae  Muscles/Joints/Bones: No morning stiffness, muscle aches and pain or loss of height.    Current Medications:  Current Outpatient Prescriptions   Medication Sig     etonogestrel-ethinyl estradiol (NUVARING) 0.12-0.015 mg/24 hr vaginal ring Insert one (1) ring vaginally and leave in place for three (3) weeks, then remove for one (1) week.     methIMAzole (TAPAZOLE) 10 MG tablet TAKE 1 TABLET(10 MG) BY MOUTH THREE TIMES DAILY       Patients Active Problems:  Patient Active Problem List   Diagnosis     Graves disease     Perineal laceration -4th degee previous pregnancy      Pelvic floor dysfunction     Menorrhagia due to intrauterine device (IUD)     Anemia       History:   reports that she has never smoked. She does not have any smokeless tobacco history on file. She reports that she drinks alcohol. She reports that she does not use illicit drugs.   reports that she has never smoked. She does not have any smokeless tobacco history on file. She reports that she drinks alcohol. She reports that she does not use illicit drugs.  History   Smoking Status     Never Smoker   Smokeless Tobacco     Not on file      reports that she has never smoked. She does not have any smokeless tobacco history on file. She reports that she drinks alcohol. She reports that she does not use illicit drugs.  History   Sexual Activity     Sexual activity: Yes     Partners: Male     Birth control/ protection: IUD     Comment:  "Paragard     Past Medical History:   Diagnosis Date     Fourth degree perineal laceration 09/2015     Graves disease 09/2014     Family History   Problem Relation Age of Onset     Graves' disease Maternal Aunt      Past Medical History:   Diagnosis Date     Fourth degree perineal laceration 09/2015     Graves disease 09/2014     Past Surgical History:   Procedure Laterality Date     CHOLECYSTECTOMY  03/25/2013     Episiotomy         Vitals   height is 5' 4.5\" (1.638 m) and weight is 140 lb (63.5 kg). Her blood pressure is 104/68.         Exam  General appearance: The patient looked well, not in acute distress.  Eyes: no evidence of thyroid eye disease.   Retinal exam: No evidence of diabetic retinopathy.  Mouth and Throat: Normal  Neck: No evidence of thyromegaly, enlarged lymph node or tenderness  Chest: Trachea is central. Chest is clear to auscultation and percussion. Breat sounds are normal.  Cardiovascular exam: JVP is not raised. Heart sounds are normal, no murmurs or rub  Peripheral pulses are palpable.   Abdomen: No masses or tenderness.    Back: No vertebral tenderness or kyphosis.  Extremities: No evidence of leg edema.   Skin: Normal to touch.  No abnormal striae  Neurologic exam:  Visual fields are intact by confrontation, grossly intact. No evidence of peripheral neuropathy.  Detailed foot exam normal.        Diagnosis:  No diagnosis found.    Orders:   No orders of the defined types were placed in this encounter.        Assessment and Plan: Hyperthyroidism due to Graves' disease I discussed the pathophysiology of the disease with the patient.    On his rebound she was started on Tapazole 10 mg 3 times a day now the dose is down to 10 mg twice a day.    Her symptoms has resolved with.    If anything she does look hypothyroid to me with some hoarseness of voice.    The patient does not want to continue on the Tapazole and she requests radioactive iodine treatment.    I did advise the patient to consist " discontinue Tapazole will check her thyroid function test today    After dented 10 days we will do thyroid scan and uptake and radioactive iodine ablation after that.    Thyroid exam shows irregular thyroid I will go ahead and do thyroid ultrasound if there is any nodules were biopsied that.    I discussed with the patient in detail the precautions that need to be taken before and after the radioactive iodine treatment.    I advised her not to become pregnant for 6 months after the radioactive iodine treatment of course she cannot be pregnant now or nursing.    Patient return to clinic in 6 months we will check her thyroid function test in 4 weeks after the radioactive iodine ablation    I did spend 60 minutes with the patient more than 50% was spent on counseling and managing her care.

## 2021-06-15 PROBLEM — E05.00 GRAVES DISEASE: Status: ACTIVE | Noted: 2017-04-18

## 2021-06-15 PROBLEM — Z31.69 PRE-CONCEPTION COUNSELING: Status: ACTIVE | Noted: 2017-04-20

## 2021-06-15 PROBLEM — S31.41XA LACERATION WITHOUT FOREIGN BODY OF VAGINA AND VULVA, INITIAL ENCOUNTER: Status: ACTIVE | Noted: 2017-04-18

## 2021-06-16 PROBLEM — R23.3 EASY BRUISING: Status: ACTIVE | Noted: 2019-09-22

## 2021-06-16 PROBLEM — Z00.00 PREVENTATIVE HEALTH CARE: Status: ACTIVE | Noted: 2019-01-02

## 2021-06-16 PROBLEM — R05.9 COUGH: Status: ACTIVE | Noted: 2021-04-13

## 2021-06-16 PROBLEM — B35.4 TINEA CORPORIS: Status: ACTIVE | Noted: 2019-02-19

## 2021-06-16 PROBLEM — L98.9 SKIN LESION: Status: ACTIVE | Noted: 2019-01-02

## 2021-06-16 PROBLEM — E55.9 VITAMIN D DEFICIENCY: Status: ACTIVE | Noted: 2019-09-22

## 2021-06-16 PROBLEM — M25.531 RIGHT WRIST PAIN: Status: ACTIVE | Noted: 2021-05-12

## 2021-06-16 PROBLEM — L50.8 CHRONIC URTICARIA: Status: ACTIVE | Noted: 2019-01-02

## 2021-06-16 NOTE — PROGRESS NOTES
ASSESSMENT AND PLAN:     Problem List Items Addressed This Visit        Unprioritized    Cough     Nighttime cough   Ddx: Gerd vs. pnd vs Asthma  Suspect gerd.  Weight gain noted related to pregnancy (excess weight is known to worsen gerd), patient noted epigastric discomfort (also consistent with gerd), noted gerd in evenings so would make sense that this would worsen after laying down. Has not tried meds.      Recommend trial prilosec 30 min before dinner since its a problem at night. If that is not successful could try Trial albuterol at night.  If successful with prilosec, but ongoing sx beyond 2 months, recommend egd.            Other Visit Diagnoses     Gastroesophageal reflux disease with esophagitis without hemorrhage    -  Primary    Relevant Medications    omeprazole (PRILOSEC) 20 MG capsule           Chief Complaint   Patient presents with     Feeling raspy while laying down?     Since delivering her baby 8 months ago     Not sure if she should be concerned        HPI  Kenyetta Cordero is a 30 y.o. female  had a baby girl 8 months ago. [facility]     Ever since she delivered she has a sensation like she needs to cough and she feels wheezy.        Social History     Tobacco Use   Smoking Status Never Smoker   Smokeless Tobacco Never Used      Current Outpatient Medications on File Prior to Visit   Medication Sig Dispense Refill     cholecalciferol, vitamin D3, 125 mcg (5,000 unit) capsule Take 1 capsule (5,000 Units total) by mouth daily. 100 capsule 0     levothyroxine (SYNTHROID, LEVOTHROID) 125 MCG tablet        pnv #6-iron-FA-B12-calcium-D3 35 mg(d)/1.1 mg -600 unit (n) TbSQ Take 1 tablet by mouth daily. 365 tablet 0     [DISCONTINUED] sertraline (ZOLOFT) 50 MG tablet        [DISCONTINUED] levothyroxine (SYNTHROID, LEVOTHROID) 175 MCG tablet TAKE 1 TABLET(175 MCG) BY MOUTH DAILY 90 tablet 0     No current facility-administered medications on file  "prior to visit.       Allergies   Allergen Reactions     Birch      Other Environmental Allergy      Seasonal allergies.     Ros -   No breathing problems.     OBJECTIVE: /60 (Patient Site: Left Arm, Patient Position: Sitting, Cuff Size: Adult Regular)   Pulse 90   Ht 5' 4\" (1.626 m)   Wt 166 lb (75.3 kg)   SpO2 99%   BMI 28.49 kg/m     Physical Exam  Constitutional:       General: She is not in acute distress.     Appearance: Normal appearance. She is well-developed.   HENT:      Head: Normocephalic and atraumatic.   Eyes:      Conjunctiva/sclera: Conjunctivae normal.   Neck:      Musculoskeletal: Neck supple.   Cardiovascular:      Rate and Rhythm: Normal rate and regular rhythm.      Heart sounds: Normal heart sounds.   Pulmonary:      Effort: Pulmonary effort is normal.      Breath sounds: Normal breath sounds.   Abdominal:      General: Bowel sounds are normal. There is no distension.      Palpations: Abdomen is soft. There is no mass.      Tenderness: There is abdominal tenderness (epigastric tenderness noted on exam.). There is no right CVA tenderness, left CVA tenderness, guarding or rebound.      Hernia: No hernia is present.   Musculoskeletal: Normal range of motion.   Skin:     General: Skin is warm and dry.   Neurological:      Mental Status: She is alert and oriented to person, place, and time.   Psychiatric:         Mood and Affect: Mood normal.         Behavior: Behavior normal.         Thought Content: Thought content normal.         Judgment: Judgment normal.         This note was created using Dragon dictation.  Please excuse any grammatical errors.   "

## 2021-06-17 NOTE — PROGRESS NOTES
ASSESSMENT AND PLAN:     Problem List Items Addressed This Visit        Unprioritized    Right wrist pain - Primary     Plan - She is right hand dominant  Notes worsening right wrist pain/ rom and function over past 5 years.  Discussed physical therpay referral vs ortho referral.   Decided to start with xray right wrist  And get ortho referral.          Relevant Orders    XR Wrist Right 2 VWS    Ambulatory referral to Orthopedics           Chief Complaint   Patient presents with     Wrist Pain     On and off for 5+ years, and now is feeling worse.         HPI  Kenyetta Cordero is a 31 y.o. female comes in for ongoing and worsening right wrist pain    Complains of right wrist pain x 5 years. Saw a doctor around time of onset and was given a brace bu since then it is worsening.  She cannot put weight on it like in down dog in yoga. Right hand dominant      Social History     Tobacco Use   Smoking Status Never Smoker   Smokeless Tobacco Never Used      Current Outpatient Medications on File Prior to Visit   Medication Sig Dispense Refill     cholecalciferol, vitamin D3, 125 mcg (5,000 unit) capsule Take 1 capsule (5,000 Units total) by mouth daily. 100 capsule 0     levothyroxine (SYNTHROID, LEVOTHROID) 125 MCG tablet        omeprazole (PRILOSEC) 20 MG capsule TAKE 1 CAPSULE(20 MG) BY MOUTH DAILY BEFORE BREAKFAST 90 capsule 0     pnv #6-iron-FA-B12-calcium-D3 35 mg(d)/1.1 mg -600 unit (n) TbSQ Take 1 tablet by mouth daily. 365 tablet 0     sertraline (ZOLOFT) 50 MG tablet Take 75 mg by mouth daily.       No current facility-administered medications on file prior to visit.       Allergies   Allergen Reactions     Birch      Other Environmental Allergy      Seasonal allergies.       OBJECTIVE: /70   Pulse 72   Resp 14    Physical Exam  Constitutional:       General: She is not in acute distress.     Appearance: She is well-developed.   HENT:      Head: Normocephalic and atraumatic.   Eyes:       Conjunctiva/sclera: Conjunctivae normal.   Neck:      Musculoskeletal: Neck supple.   Cardiovascular:      Rate and Rhythm: Normal rate and regular rhythm.   Pulmonary:      Effort: Pulmonary effort is normal.   Musculoskeletal:      Right wrist: She exhibits decreased range of motion (reduced flexion and extension rom, seems to be limited due to mechanical obstruction at the distal end of the radius.  lateral rom is preserved. ), tenderness (tender over distal end of radius. negative tinnels sign and no pain in fingers or hand), bony tenderness (tender over joint just distal to the distal radius.) and swelling (mild swelling compared to contralateral side over right radial wrist joint.). She exhibits no effusion, no crepitus, no deformity and no laceration.   Skin:     General: Skin is warm and dry.   Neurological:      Mental Status: She is alert and oriented to person, place, and time.            This note was created using Dragon dictation.  Please excuse any grammatical errors.

## 2021-06-18 NOTE — PROGRESS NOTES
Progress Note    Reason for Visit:  Chief Complaint     Thyroid Problem          Progress Note:    HPI:       This patient is here for follow-up because of post ablative hypothyroidism      Component      Latest Ref Rng & Units 7/28/2017 12/4/2017 5/17/2018   Creatinine      0.60 - 1.10 mg/dL  0.79    GFR MDRD Af Amer      >60 mL/min/1.73m2  >60    GFR MDRD Non Af Amer      >60 mL/min/1.73m2  >60    TSH      0.30 - 5.00 uIU/mL <0.01 (L) 1.90 74.60 (H)   Free T4      0.7 - 1.8 ng/dL 3.7 (H) 1.0 <0.4 (L)   T3, Total      45 - 175 ng/dL  110 <25 (L)   Potassium      3.5 - 5.0 mmol/L  4.6    Thyroid Peroxidase Ab      0.0 - 5.6 IU/mL  146.4 (H)    Calcium      8.5 - 10.5 mg/dL  9.6    PTH      10 - 86 pg/mL  35    Vitamin D, Total (25-Hydroxy)      30.0 - 80.0 ng/mL  33.3        NM HYPERTHYROID THERAPY  1/5/2018 2:31 PM     INDICATION: Thyrotoxicosis, unspecified without thyrotoxic crisis or storm.  COMPARISON: Thyroid uptake and scan dated 01/05/2018.     FINDINGS: The patient was given 20.1 mCi of iodine-131 orally for treatment of hyperthyroidism. The patient was instructed in basic radiation safety practices. The risks and benefits of the procedure were explained by the nuclear medicine technologist   prior to the radionuclide administration. The patient tolerated the procedure well. No immediate complication.    NM THYROID UPTAKE AND SCAN  1/5/2018 9:12 AM     INDICATION: Clinical and laboratory evidence of hyperthyroidism.  TECHNIQUE: 0.295 millicuries I-123 was ingested by the patient, and 24-hour neck uptake and pinhole imaging performed.  COMPARISON: Thyroid ultrasound 12/8/2017.     FINDINGS: 24-hour uptake is 37.2% which is above the normal range of 10-30%. No focal lesion demonstrated.     IMPRESSION:   CONCLUSION:  1.  Abnormally high thyroid uptake.    Review of Systems:    Nervous System: No headache, dizziness, fainting or memory loss. No tingling sensation of hand or feet.  Ears: No hearing loss or  ringing in the ears  Eyes: No blurring of vision, redness, itching or dryness.  Nose: No nosebleed or loss of smell  Mouth: No mouth sores or loss of taste  Throat: No hoarseness or difficulty swallowing  Neck: No enlarged thyroid or lymph nodes.  Heart: No chest pain, palpitation or irregular heartbeat. No swelling of hands or feet  Lungs: No shortness of breath, cough, night sweats, wheezing or hemoptysis.  Gastrointestinal: No nausea or vomiting, constipation or diarrhea.  No acid reflux, abdominal pain or blood in stools.  Kidney/Bladdr: No polyuria, polydipsia, nocturia or hematuria.  Genital/Sexual: No loss of libido  Skin: No rash, hair loss or hirsutism.  No abnormal striae  Muscles/Joints/Bones: No morning stiffness, muscle aches and pain or loss of height.    Current Medications:  Current Outpatient Prescriptions   Medication Sig     levothyroxine (SYNTHROID, LEVOTHROID) 88 MCG tablet Take 1 tablet (88 mcg total) by mouth daily.       Patients Active Problems:  Patient Active Problem List   Diagnosis     Graves disease     Perineal laceration -4th degee previous pregnancy      Pelvic floor dysfunction     Menorrhagia due to intrauterine device (IUD)     Anemia       History:   reports that she has never smoked. She does not have any smokeless tobacco history on file. She reports that she drinks alcohol. She reports that she does not use illicit drugs.   reports that she has never smoked. She does not have any smokeless tobacco history on file. She reports that she drinks alcohol. She reports that she does not use illicit drugs.  History   Smoking Status     Never Smoker   Smokeless Tobacco     Not on file      reports that she has never smoked. She does not have any smokeless tobacco history on file. She reports that she drinks alcohol. She reports that she does not use illicit drugs.  History   Sexual Activity     Sexual activity: Yes     Partners: Male     Birth control/ protection: IUD     Comment:  "Paragard     Past Medical History:   Diagnosis Date     Fourth degree perineal laceration 09/2015     Graves disease 09/2014     Family History   Problem Relation Age of Onset     Graves' disease Maternal Aunt      Past Medical History:   Diagnosis Date     Fourth degree perineal laceration 09/2015     Graves disease 09/2014     Past Surgical History:   Procedure Laterality Date     CHOLECYSTECTOMY  03/25/2013     Episiotomy         Vitals   height is 5' 4.5\" (1.638 m) and weight is 143 lb 1.6 oz (64.9 kg). Her blood pressure is 112/60.         Exam  General appearance: The patient looked well, not in acute distress.  Eyes: no evidence of thyroid eye disease.   Retinal exam: No evidence of diabetic retinopathy.  Mouth and Throat: Normal  Neck: No evidence of thyromegaly, enlarged lymph node or tenderness  Chest: Trachea is central. Chest is clear to auscultation and percussion. Breat sounds are normal.  Cardiovascular exam: JVP is not raised. Heart sounds are normal, no murmurs or rub  Peripheral pulses are palpable.   Abdomen: No masses or tenderness.    Back: No vertebral tenderness or kyphosis.  Extremities: No evidence of leg edema.   Skin: Normal to touch.  No abnormal striae  Neurologic exam:  Visual fields are intact by confrontation, grossly intact. No evidence of peripheral neuropathy.  Detailed foot exam normal.        Diagnosis:  No diagnosis found.    Orders:   No orders of the defined types were placed in this encounter.        Assessment and Plan:  Post ablative hypothyroidism.  The patient was on Tapazole before she opted to go with radioactive iodine which we did back in January she received 20.1 mCi.  Her TSH went up from 0 to let us from undetectable-74.6    Free T4 is less than 0.4.    She feels fatigue and tired with swelling of the hands and feet numbness with cold intolerance she has no had no cycles.    2 weeks ago I contacted the patient was started on on Synthroid 88 mcg.  She starting to " feel better.    I did advise the patient to take 1-1/2 tablets of what we have and after that would give her 125 mcg of Synthroid and will check her thyroid function test every 6 weeks and continue adjusting the dose.    She has swelling swelling of the eyelids as well.    Thyroid exam is normal.  Patient will return to clinic in 6 months.    I have reviewed and ordered clinical lab test    I have reviewed and ordered radiology tests.    I have reviewed and ordered her medication as required.    I have reviewed her test results and advised with the performing physician.    I have reviewed the patient's old records.    I have reviewed and summarized the patient old records.    I did spend 40 minutes with the patient more than 50% was spent on counseling and managing her care.

## 2021-06-18 NOTE — PATIENT INSTRUCTIONS - HE
Patient Instructions by Naun Kaur PA-C at 8/2/2020  8:40 AM     Author: Naun Kaur PA-C Service: -- Author Type: Physician Assistant    Filed: 8/2/2020 10:08 AM Encounter Date: 8/2/2020 Status: Signed    : Naun Kaur PA-C (Physician Assistant)       Suggested increased rest increased fluids and bedside humidification  Over-the-counter Tylenol for comfort.  Follow packaging directions  Over-the-counter throat lozenges with benzocaine such as Cepacol may be used if indicated and is not a choking hazard based on age.  Follow packaging directions.  Do not overuse the benzocaine as it will dry the throat and make it uncomfortable.  Noncontagious after 24 hours on the antibiotic.  You will be treated as if your strep throat was positive.  Culture is to follow.  You are being treated for your sick close household contact with your child since you are the primary caregiver and you are also pregnant.  Change toothbrush out after 48 hours to avoid reinfecting the mouth.  Follow-up after completion of the antibiotic if any consultation or sequela.        As a result of our visit today, here are the action plans for you:    1. Medication(s) to stop: There are no discontinued medications.    2. Medication(s) to start or change:   Medications Ordered   Medications   ? amoxicillin (AMOXIL) 875 MG tablet     Sig: Take 1 tablet (875 mg total) by mouth 2 (two) times a day for 10 days.     Dispense:  20 tablet     Refill:  0       3. Other instructions: Yes      Self-Care for Sore Throats  Sore throats happen for many reasons, such as colds, allergies, and infections caused by viruses or bacteria. In any case, your throat becomes red and sore. Your goal for self-care is to reduce your discomfort while giving your throat a chance to heal.    Moisten and soothe your throat  Tips include the following:    Try a sip of water first thing after waking up.    Keep your throat moist by drinking 6 or more glasses of clear  liquids every day.    Run a cool-air humidifier in your room overnight.    Avoid cigarette smoke.     Suck on throat lozenges, cough drops, hard candy, ice chips, or frozen fruit-juice bars. Use the sugar-free versions if your diet or medical condition requires them.  Gargle to ease irritation  Gargling every hour or 2 can ease irritation. Try gargling with 1 of these solutions:    1/4 teaspoon of salt in 1/2 cup of warm water    An over-the-counter anesthetic gargle  Use medicine for more relief  Over-the-counter medicine can reduce sore throat symptoms. Ask your pharmacist if you have questions about which medicine to use:    Ease pain with anesthetic sprays. Aspirin or an aspirin substitute also helps. Remember, never give aspirin to anyone 18 or younger, or if you are already taking blood thinners.     For sore throats caused by allergies, try antihistamines to block the allergic reaction.    Remember: unless a sore throat is caused by a bacterial infection, antibiotics wont help you.  Prevent future sore throats  Prevention tips include the following:    Stop smoking or reduce contact with secondhand smoke. Smoke irritates the tender throat lining.    Limit contact with pets and with allergy-causing substances, such as pollen and mold.    When youre around someone with a sore throat or cold, wash your hands often to keep viruses or bacteria from spreading.    Dont strain your vocal cords.  Call your healthcare provider  Contact your healthcare provider if you have:    A temperature over 101 F (38.3 C)    White spots on the throat    Great difficulty swallowing    Trouble breathing    A skin rash    Recent exposure to someone else with strep bacteria    Severe hoarseness and swollen glands in the neck or jaw   Date Last Reviewed: 8/1/2016 2000-2016 [employer]. [address] [place] [address], [place] [address]. All rights reserved. This information is not intended as a substitute for professional  medical care. Always follow your healthcare professional's instructions.

## 2021-06-18 NOTE — PATIENT INSTRUCTIONS - HE
Patient Instructions by Luly Martínez MD at 4/13/2021  1:00 PM     Author: Luly Martínez MD Service: -- Author Type: Physician    Filed: 4/13/2021  1:14 PM Encounter Date: 4/13/2021 Status: Addendum    : Luly Martínez MD (Physician)    Related Notes: Original Note by Luly Martínez MD (Physician) filed at 4/13/2021  1:10 PM

## 2021-06-20 NOTE — LETTER
Letter by Deisy Barry at      Author: Deisy Barry Service: -- Author Type: --    Filed:  Encounter Date: 9/29/2020 Status: (Other)         September 29, 2020       Kenyetta N Muevidt  [address] [place] [address]    Dear Kenyetta HAND Handevidt:    We are pleased to provide you with secure, online access to medical information for you and your family within [facility]. Per your request, we have expanded your account to allow access to the records of the following family members:              Marisol CAST Handevidt (privilege ends on 9/9/2027.)            Maureen PERRY Handevidt (privilege ends on 8/11/2032.)     How Do I Log In?  1. In your Internet browser, go to [email or web address]  2. Log into [facility] using your [facility] Username and Password.  3. Click Sign In.        How Do I Access a Family Member's Account?  4. Select the account you want to access by clicking the [place] with the appropriate patient's name at the top of your screen.   5. You will see a disclaimer page letting you know that you will be viewing a family member's record. Review the disclaimer and then click Accept Proxy Access Disclaimer to proceed.  6. Once you switch to viewing a family member's record, you can navigate to [facility] pages the same way you would for yourself. You can return to your own account by clicking the [place] at the top of the screen with your name on it.    7. To customize the colors and names of the linked accounts, you can select Personalize from the Profile dropdown menu at the top of the screen, then click the Edit button to make changes.     Additional Information  If you have questions, visit [email or web address], e-mail [email or web address] or call [phone] to talk to our [facility] staff. Remember, [facility] is NOT to be used for urgent needs. For medical emergencies, dial 911.             
Partially impaired: cannot see medication labels or newsprint, but can see obstacles in path, and the surrounding layout; can count fingers at arm's length

## 2021-06-22 NOTE — PROGRESS NOTES
ASSESSMENT AND PLAN:     Problem List Items Addressed This Visit        Unprioritized    Graves disease     Dose of levothyroxine was recently increased from 125 mcg/day to 150 mcg/day and she is planning to have this rechecked 4-6 weeks from the time of the change.         Idiopathic urticaria - Primary     The following was reiterated: Pathophysiology of idiopathic urticaria was discussed today.  We discussed that itching the skin will worsen this condition.  Also if the skin is dry this will worsen the condition.     Discussed the pathophysiology of eczema and importance of keeping the skin hydrated.  I do wonder whether her hypothyroidism is causing her skin to be drier than usual and more sensitive.     Step 1-change cleanser to a non-foaming cleanser such as Cetaphil or cerave     Step 2-apply Aquaphor ointment within 3 minutes of bathing and twice daily     Step 3- can use otc steroid cream for intermittent use if needed.     Step 4 - Claritin if needed. she was educated on mechanism of action and that it may take two weeks to see full effect due to circulating histamines.        Step 5 -oral steroids if needed     At this point she was also offered dermatology or allergy referral.  She says she did develop an allergy to apples suddenly some years ago and she wonders if maybe she is developed a new allergy.  She decided to defer dermatology referral and would like to see the allergist to have allergy testing done.    In the meantime Claritin 10 mg orally per day was prescribed she is asked to take it every day.  Once her hives are under control we can try going off the Claritin for a few days to see if the hives return.  It is possible she will need Claritin more chronically.         Relevant Medications    loratadine (CLARITIN) 10 mg tablet    Other Relevant Orders    Ambulatory referral to Allergy           Chief Complaint   Patient presents with     Urticaria     States she is having ongoing issues with  hives, on and off.  Happens randomly every day.         HPI  Kenyetta Cordero is a 28 y.o. female says that she took the Medrol Dosepak for the hives but it did not really help.  She has not been having hives constantly but they are noted on and off throughout the day.  One day she was walking and noticed that her coat was rubbing on her jaw area and after the walk she ended up having hives where she noted her coat was rubbing her jaw.  She says the hives are not terribly bothersome but she just wants to know what is going on and if there is a bigger problem underlying.  We did notice her thyroid was low and titrated her medications up recently.  She says she talk to Dr. Perez her endocrinologist who is fine with primary care managing her thyroid.  She has not tried Claritin or over-the-counter Benadryl for the hives.  She says she had some hives this morning but they self resolved spontaneously.  She did change her soap to something that was more anti-allergenic but she is not exactly sure what the brand is called.    Social History     Tobacco Use   Smoking Status Never Smoker   Smokeless Tobacco Never Used      Current Outpatient Medications on File Prior to Visit   Medication Sig Dispense Refill     levothyroxine (SYNTHROID) 150 MCG tablet Take 1 tablet (150 mcg total) by mouth daily. 30 tablet 1     methylPREDNISolone (MEDROL DOSEPACK) 4 mg tablet Take 1 tablet (4 mg total) by mouth daily for 6 days. Follow package directions 21 tablet 0     NUVARING 0.12-0.015 mg/24 hr vaginal ring INSERT 1 RING VAGINALLY AND LEAVE IN PLACE FOR 3 WEEKS THEN REMOVE FOR 1 WEEK 3 each 3     predniSONE (DELTASONE) 20 MG tablet Take 40 mg by mouth daily for 2 days, THEN 20 mg daily for 2 days, THEN 10 mg daily for 2 days. 7 tablet 0     No current facility-administered medications on file prior to visit.       Allergies   Allergen Reactions     Apple Swelling     Swollen throat     Other Environmental Allergy      Seasonal  allergies.         Review of Systems   Constitutional: Negative.    HENT: Negative.    Eyes: Negative.    Respiratory: Negative.    Cardiovascular: Negative.    Gastrointestinal: Negative.    Endocrine: Negative.    Genitourinary: Negative.    Musculoskeletal: Negative.    Skin: Negative.    Neurological: Negative.    Hematological: Negative.    Psychiatric/Behavioral: Negative.         OBJECTIVE: /70   Pulse 68   Resp 14   LMP 12/10/2018 (Exact Date)    Physical Exam   Constitutional: She is oriented to person, place, and time. She appears well-developed and well-nourished. No distress.   HENT:   Head: Normocephalic and atraumatic.   Eyes: Conjunctivae are normal.   Neck: Neck supple.   Cardiovascular: Normal rate and regular rhythm.   Pulmonary/Chest: Effort normal.   Musculoskeletal: Normal range of motion.   Neurological: She is alert and oriented to person, place, and time.   Skin: Skin is warm and dry.   Psychiatric: She has a normal mood and affect.        This note was created using Dragon dictation.  Please excuse any grammatical errors.

## 2021-06-22 NOTE — PROGRESS NOTES
ASSESSMENT AND PLAN:     Problem List Items Addressed This Visit        Unprioritized    Graves disease - Primary     She tells me that she had her thyroid ablated and is now on thyroid hormone replacement therapy.  She is currently taking levothyroxine 125 mcg orally per day.  Her last TSH was normal.  She would like to follow with me as long as her thyroid levels are staying normal.  She had seen Dr. Perez in the past but because his schedule is really difficult to get in-she would prefer to come here.    TSH is ordered today.         Relevant Orders    Thyroid Stimulating Hormone (TSH)    Idiopathic urticaria     Pathophysiology of idiopathic urticaria was discussed today.  We discussed that itching the skin will worsen this condition.  Also if the skin is dry this will worsen the condition.    Discussed the pathophysiology of eczema and importance of keeping the skin hydrated.    Step 1-change cleanser to a non-foaming cleanser such as Cetaphil or cerave    Step 2-apply Aquaphor ointment within 3 minutes of bathing and twice daily    Step 3- can use otc steroid cream for intermittent use if needed.    Step 4 - Claritin if needed. she was educated on mechanism of action and that it may take two weeks to see full effect due to circulating histamines.       Step 5 - medrol dose pack prescribed today due to irresistible pruritus described that the patient today.    If this is not resolving she can let me know and we can refer her to dermatology.         Relevant Medications    methylPREDNISolone (MEDROL DOSEPACK) 4 mg tablet    Encounter for preventive care     It was recommended today that she schedule an annual exam as she is overdue.  She also requested that she switch PCPs to me.         Skin lesion     There is an 8 mm oval scaly lesion noted in the right groin fold.  At this point we discussed the option of biopsy versus watchful waiting.  She prefers to watch and wait and we can biopsy if it is not resolving  or if it worsens.                Chief Complaint   Patient presents with     Rash     Patient has been dealing with a rash on and off for the last couple of days.  Is better today, but has pictures.         HPI  Kenyetta Cordero is a 28 y.o. female comes in today saying that she is felt itchy all over her body for the past couple days.  She actually had what looked like bug bites over her arms and torso yesterday but these have improved with over-the-counter topical hydrocortisone and oral Benadryl over-the-counter.    Today she has no lesions but brought in a picture on her phone of what appears to be hives.    In addition she has a lesion in the right groin fold that she wants me to look at today.  It is not particularly bothersome but she wonders what it is.    She has been seeing Dr. Perez-endocrinology for Graves' disease and had a thyroid ablation.  She has been on Synthroid 125 mcg/day and has been told that she is due for a repeat thyroid test.  She is unable to get into Dr. Perez schedule and wonders if I can take over her thyroid care.        Social History     Tobacco Use   Smoking Status Never Smoker   Smokeless Tobacco Never Used      Current Outpatient Medications on File Prior to Visit   Medication Sig Dispense Refill     levothyroxine (SYNTHROID, LEVOTHROID) 125 MCG tablet Take 1 tablet (125 mcg total) by mouth daily. 90 tablet 1     NUVARING 0.12-0.015 mg/24 hr vaginal ring INSERT 1 RING VAGINALLY AND LEAVE IN PLACE FOR 3 WEEKS THEN REMOVE FOR 1 WEEK 3 each 3     No current facility-administered medications on file prior to visit.       Allergies   Allergen Reactions     Apple Swelling     Swollen throat     Other Environmental Allergy      Seasonal allergies.         Review of Systems   Constitutional: Negative.    HENT: Negative.    Eyes: Negative.    Respiratory: Negative.    Cardiovascular: Negative.    Gastrointestinal: Negative.    Endocrine: Negative.    Genitourinary: Negative.   "  Musculoskeletal: Negative.    Skin: Negative.    Neurological: Negative.    Hematological: Negative.    Psychiatric/Behavioral: Negative.         OBJECTIVE: BP 98/62   Pulse 72   Resp 16   Ht 5' 4.5\" (1.638 m)   Wt 143 lb (64.9 kg)   LMP 12/10/2018 (Exact Date)   Breastfeeding? No   BMI 24.17 kg/m     Physical Exam   Constitutional: She is oriented to person, place, and time. She appears well-developed and well-nourished. No distress.   HENT:   Head: Normocephalic and atraumatic.   Eyes: Conjunctivae are normal.   Neck: Neck supple.   Cardiovascular: Normal rate and regular rhythm.   Pulmonary/Chest: Effort normal.   Musculoskeletal: Normal range of motion.   Neurological: She is alert and oriented to person, place, and time.   Skin: Skin is warm and dry.   Aside from subjective complaint that the patient feels itchy all over her skin-the skin does look normal today.    In the right groin fold there is an 8 mm oval scaly well demarcated lesion seen.  It is not thickened.   Psychiatric: She has a normal mood and affect.      Orders Placed This Encounter   Procedures     Thyroid Stimulating Hormone (TSH)        This note was created using Dragon dictation.  Please excuse any grammatical errors.   "

## 2021-06-22 NOTE — TELEPHONE ENCOUNTER
RN Assessment/Reason for Call:   Okay to leave Detailed Message  Patient calling in, rash 2 days on and off. Butt, look like bug bites, welts.  On skin.Arms and itches. Red.  Patchy and random.    RN Action/Disposition:  Protocol recommends see Dr in 3 days; offered My Chart to send picture.  Appt  1/2/19 8:20am  Call back if worse symptoms  Discussed home care measures.  Agrees to plan.     Alicia Barron RN    [facility] Triage/med refill  1/1/2019  6:32 PM        Reason for Disposition    Mild widespread rash    Protocols used: RASH OR REDNESS - WIDESPREAD-A-

## 2021-06-23 NOTE — TELEPHONE ENCOUNTER
Please call patient and let her know that she needs to establish care with me to get refills of her contraception. I have only seen her twice for a rash. But once she establishes care with me I will be happy to fill it. Also she may not be aware that insurance will not likely pay for the lost nuvarings and she might have to pay out of pocket. We can then route this to previous prescribing doc to see if they will fill until she establishes with me, if that is her intention.   
mass, no skin change and no tenderness. Left breast exhibits no mass, no skin change and no tenderness. Patient had breast implants   Abdominal: Soft. Bowel sounds are normal. She exhibits no distension and no mass. There is no tenderness. Genitourinary: Vagina normal. There is no rash, tenderness or lesion on the right labia. There is no rash, tenderness or lesion on the left labia. No tenderness in the vagina. No foreign body in the vagina. No vaginal discharge found. Genitourinary Comments: Patient had hysterectomy   Musculoskeletal: Normal range of motion. She exhibits no edema. Lymphadenopathy:     She has no cervical adenopathy. Right: No inguinal adenopathy present. Left: No inguinal adenopathy present. Neurological: She is alert and oriented to person, place, and time. No cranial nerve deficit. She exhibits normal muscle tone. Coordination normal.   Psychiatric: She has a normal mood and affect. Her behavior is normal.       ASSESSMENT/ PLAN:    1. Encounter for gynecological examination  - PAP SMEAR    2. Vaginal dryness  - PAP SMEAR    3. H/O total hysterectomy  - PAP SMEAR                - Appropriate prescription are addressed. - After visit summery provided. - Questions answered and patient verbalizes understanding.  - Call for any problem, questions, or concerns. Return in about 3 months (around 4/16/2018).

## 2021-06-23 NOTE — PROGRESS NOTES
Chief complaint: Hives    History of present illness: This is a pleasant 28-year-old woman who is here today for evaluation of hives.  She states for the last 3 weeks she has been having difficulty with hives.  Initially they began on her arms.  She describes them as red bumpy itchy raised lesions.  The lesions do resolve within the day.  She states they spread down to her legs and feet.  She also has some on her backside.  She reports Benadryl seemed to help.  Cortisone cream did not seem to help.  She was given prednisone but reports that it did not seem to help either.  She has not tried Claritin as was suggested by her primary care physician.  She notes that she does have Graves' disease.  She was diagnosed in 2014 but did not have ablation until about a year ago.  She reports that her thyroid level is still abnormal and they are still adjusting her levothyroxine.  She wonders if her thyroid disease could be causing some of her hives.  She denies any travel outside the .  She does state that she has had hives previously but it has been some years since she has had them.  Previously they were somewhat random as well.  No swelling of her lips or eyes.  No bruising to the hives.  She does use nonsteroidal anti-inflammatory drugs but does not note any association to them and her hives.    She does have a history of environmental allergies but states that symptoms are rather well controlled.  She does note that when she eats apples she has itchy mouth and throat.  She states this happened with a baked apple as well.  She saw a physician in [place] tested her via specific IgE testing.  She was told she was positive apple.  She did not have any more systemic symptoms besides itchy mouth and throat.  She does not carry an EpiPen.  This is not happen with any other foods.    Past medical history: Cholecystectomy    Social history: She works in early education, has a cat and dog at home, lives at home with [place] and a  "basement, no exposure to mold, non-smoker    Family history: Daughter with asthma    Review of Systems performed as above and the remainder is negative.         Current Outpatient Medications:      levothyroxine (SYNTHROID) 150 MCG tablet, Take 1 tablet (150 mcg total) by mouth daily., Disp: 30 tablet, Rfl: 1     NUVARING 0.12-0.015 mg/24 hr vaginal ring, INSERT 1 RING VAGINALLY AND LEAVE IN PLACE FOR 3 WEEKS THEN REMOVE FOR 1 WEEK, Disp: 3 each, Rfl: 3    Allergies   Allergen Reactions     Apple Swelling     Swollen throat     Other Environmental Allergy      Seasonal allergies.       Pulse 86   Resp 14   Ht 5' 4.5\" (1.638 m)   Wt 143 lb (64.9 kg)   SpO2 99%   BMI 24.17 kg/m    Gen: Pleasant female not in acute distress  HEENT: Eyes no erythema of the bulbar or palpebral conjunctiva, no edema. Ears: TMs well visualized, no effusions. Nose: No congestion, mucosa normal. Mouth: Throat clear, no lip or tongue edema.     Skin: No rashes or lesions  Psych: Alert and oriented times 3    Last Percutaneous Allergy Test Results  Trees  Birch Mix 1:20 H (W/F in mm): 6/40 (01/22/19 1336)  Controls  Device Type: QUINTIP (01/22/19 1336)  Neg. control: 50% Glycerine/Saline H (W/F in mm): 0-0 (01/22/19 1336)  Pos. control: Histamine 6mg/ML (W/F in mms): 6/35 (01/22/19 1336)     Last Food Skin Allergy Test Results  Fruits-Rosaceae  Apple  1:40 (W/F in mm): 0 (01/22/19 1348)  Controls  Device Type: QUINTIP (01/22/19 1348)  Neg. Control: 50% Glycerine-Saline H (W/F in millimeters): 0 (01/22/19 1348)  Pos. Control Histamine 6 mg/ml (W/F in millimeters): 6/35 (01/22/19 1348)    Impression report and plan:    1.  Acute urticaria    She now has acute urticaria but has had episodes in the past.  She could possibly be classified as chronic urticaria.  I think that she should have a vitamin D level checked.  She is mentions that she is having labs next week.  I have ordered this is a future lab to be drawn at her visit next week.  " Thyroid disease can cause hives.  I went over triggers for hives.  Recommended daily Claritin.  This can be increased to twice daily if needed.  She will notify me if symptoms are not well controlled.  She also when you know if her hives persist beyond 6 weeks.  We will then talk about doing cycles of antihistamines.    2.  Oral allergy syndrome    I do not think she has apple allergy, rather oral allergy syndrome to apples.  She was positive for birch.  Some patients cannot tolerate baked or processed foods or vegetables with oral allergy syndrome.  She should continue to avoid apples.  If she would like to try processed apple, we could retry this again.      Time spent with the patient, 30 minutes, greater than half spent counseling and coordination of care regarding food allergy and hives.

## 2021-06-23 NOTE — PATIENT INSTRUCTIONS - HE
Daily Claritin (loratidine) 10 mg     Can increase to twice daily if needed    Up to 6 weeks=acute    If chronic, notify    Notify if not controlled    Oral Allergy Syndrome

## 2021-06-24 NOTE — TELEPHONE ENCOUNTER
Refill Approved    Rx renewed per Medication Renewal Policy. Medication was last renewed on 1/3/19.    Debbie Saunders, [facility] Triage/Med Refill 3/6/2019     Requested Prescriptions   Pending Prescriptions Disp Refills     levothyroxine (SYNTHROID, LEVOTHROID) 150 MCG tablet [Pharmacy Med Name: LEVOTHYROXINE 0.150MG (150MCG) TAB] 30 tablet 0     Sig: TAKE 1 TABLET(150 MCG) BY MOUTH DAILY    Thyroid Hormones Protocol Passed - 3/3/2019 12:29 PM       Passed - Provider visit in past 12 months or next 3 months    Last office visit with prescriber/PCP: 1/8/2019 Luly Martínez MD OR same dept: 1/8/2019 Luly Martínez MD OR same specialty: 1/8/2019 Luly Martínez MD  Last physical: 2/19/2019 Last MTM visit: Visit date not found   Next visit within 3 mo: Visit date not found  Next physical within 3 mo: Visit date not found  Prescriber OR PCP: Luly Martínez MD  Last diagnosis associated with med order: 1. Graves disease  - levothyroxine (SYNTHROID, LEVOTHROID) 150 MCG tablet [Pharmacy Med Name: LEVOTHYROXINE 0.150MG (150MCG) TAB]; TAKE 1 TABLET(150 MCG) BY MOUTH DAILY  Dispense: 30 tablet; Refill: 0    If protocol passes may refill for 12 months if within 3 months of last provider visit (or a total of 15 months).            Passed - TSH on file in past 12 months for patient age 12 & older    TSH   Date Value Ref Range Status   02/19/2019 2.34 0.30 - 5.00 uIU/mL Final

## 2021-06-25 NOTE — TELEPHONE ENCOUNTER
Christopher Martínez,  As of right now, I have not heard back from Kenyetta to see if she was able to get scheduled with ob/gyn. She had asked me for the names of some places we refer to. I sent them to her, and she stated she wanted to research them a little. I then sent her a follow up [employer] message just checking in to see if she was able to get scheduled. The message has been read, but I have not heard back from her.  Ebonie

## 2021-06-27 NOTE — PROGRESS NOTES
Progress Notes by Luly Martínez MD at 2/19/2019  2:40 PM     Author: Luly Martínez MD Service: -- Author Type: Physician    Filed: 2/19/2019  3:40 PM Encounter Date: 2/19/2019 Status: Signed    : Luly Martínez MD (Physician)       FEMALE PREVENTATIVE EXAM    Assessment and Plan:       Problem List Items Addressed This Visit        Unprioritized    Graves disease     January 2019 her levothyroxine was increased from 125 mcg/day to 150 mcg/day.  A TSH should be rechecked today and medication titration should be based on results.         Relevant Orders    Thyroid Stimulating Hormone (TSH)    Perineal laceration -4th degee previous pregnancy      Pain with intercourse, based on exam today, I think this may be due to scar tissue from 4th degree laceration during child birth.  Gyn referral given.            Relevant Orders    Ambulatory referral to Gynecology    Pelvic floor dysfunction     Pain with intercourse, based on exam today, I think this may be due to scar tissue from 4th degree laceration during child birth.  Gyn referral given.     If she is not getting relief after seeing the gynecologist I would recommend [facility] [place].         Relevant Orders    Ambulatory referral to Gynecology    Encounter for surveillance of vaginal ring hormonal contraceptive device     Nuvaring renewed.         Relevant Medications    etonogestrel-ethinyl estradiol (NUVARING) 0.12-0.015 mg/24 hr vaginal ring    Preventative health care     Flu shot - recommended annually.   Pap: Normal 4/18/17-plan repeat in 2022  Mammo:  There is no family or personal history, not indicated    Colonoscopy:  There is no family or personal history, not indicated    Std testing desired:  offered  Osteoporosis prevention discussed.  vitamin d levels ordered. Recommend daily calcium and vitamin d intake to keep good bone health. Recommend weight bearing exercise, no tobacco, and limit  alcohol  dexa - no indication.  Recommend sunscreen, exercise, & healthy diet.  Offered tsh, glucose, hgb, lipid  I have had an Advance Directives discussion with the patient.   Body mass index is 23.83 kg/m .   mychart active.         Tinea corporis     lamisil 1% cream.         Relevant Medications    terbinafine HCl (LAMISIL AT) 1 % cream      Other Visit Diagnoses     Mixed hyperlipidemia    -  Primary    Relevant Orders    Lipid Cascade    Screening, anemia, deficiency, iron        Relevant Orders    HM1(CBC and Differential)    HM1 (CBC with Diff)    Encounter for screening for metabolic disorder        Relevant Orders    Comprehensive Metabolic Panel    Need for prophylactic vaccination with tetanus-diphtheria (Td)        Screening for endocrine disorder        Relevant Orders    Vitamin D, Total (25-Hydroxy)            Next follow up:  No Follow-up on file.    Immunization Review  Adult Imm Review: Missing doses of tdap, but may have gotten at gyn, so will check.   Social History     Tobacco Use   Smoking Status Never Smoker   Smokeless Tobacco Never Used     I discussed the following with the patient:   Adult Healthy Living: Importance of regular exercise  Healthy nutrition    I have had an Advance Directives discussion with the patient.    Subjective:   Chief Complaint: Kenyetta Cordero is an 28 y.o. female here for a preventative health visit.     HPI:  Still having pain with intercourse and would like me to look at the rash on her right groin area.    menorrhagia due to iud - currently resolved.       Healthy Habits  Are you taking a daily aspirin? No  Do you typically exercising at least 40 min, 3-4 times per week?  Yes  Do you usually eat at least 4 servings of fruit and vegetables a day, include whole grains and fiber and avoid regularly eating high fat foods? Yes  Have you had an eye exam in the past two years? Yes  Do you see a dentist twice per year? Yes  Do you have any concerns regarding  "sleep? No    Safety Screen  If you own firearms, are they secured in a locked gun cabinet or with trigger locks? Yes  Do you feel you are safe where you are living?: Yes (1/2/2019  8:15 AM)  Do you feel you are safe in your relationship(s)?: Yes (1/2/2019  8:15 AM)      Review of Systems:  Please see above.  The rest of the review of systems are negative for all systems.     Pap History:   Yes - updated in Problem List and Health Maintenance accordingly  Cancer Screening       Status Date      PAP SMEAR Next Due 4/18/2022      Done 4/18/2017 GYNECOLOGIC CYTOLOGY (PAP SMEAR)          Patient Care Team:  Luly Martínez MD as PCP - General (Family Medicine)        History     Reviewed By Date/Time Sections Reviewed    Luly Martínez MD 2/19/2019  3:12 PM Family    Linda Steward, [facility] 2/19/2019  2:51 PM Tobacco    Linda Steward, [facility] 2/19/2019  2:49 PM Obstetric    Linda Steward, [facility] 2/19/2019  2:48 PM Tobacco, Alcohol, Drug Use, Sexual Activity            Objective:   Vital Signs:   Visit Vitals  /70   Pulse 80   Resp 16   Ht 5' 4.5\" (1.638 m)   Wt 141 lb (64 kg)   LMP 02/04/2019 (Exact Date)   Breastfeeding? No   BMI 23.83 kg/m           PHYSICAL EXAM  Physical Exam   Constitutional: She is oriented to person, place, and time. She appears well-developed and well-nourished. No distress.   HENT:   Head: Normocephalic and atraumatic.   Right Ear: External ear normal.   Left Ear: External ear normal.   Nose: Nose normal.   Mouth/Throat: Oropharynx is clear and moist.   Eyes: Conjunctivae are normal.   Neck: Neck supple.   Cardiovascular: Normal rate, regular rhythm and normal heart sounds.   Pulmonary/Chest: Effort normal and breath sounds normal. Right breast exhibits no inverted nipple, no mass, no nipple discharge, no skin change and no tenderness. Left breast exhibits no inverted nipple, no mass, no nipple discharge, no skin change and no tenderness. Breasts are symmetrical. There is no " breast swelling.   Abdominal: Soft. Bowel sounds are normal. Hernia confirmed negative in the right inguinal area and confirmed negative in the left inguinal area.   Genitourinary:       No breast tenderness, discharge or bleeding. Pelvic exam was performed with patient supine. No labial fusion. There is no rash, tenderness, lesion or injury on the right labia. There is no rash, tenderness, lesion or injury on the left labia.   Genitourinary Comments: Pain is noted at the introitus/posterior vaginal wall with palpation no specific deformity is noted and there is no obvious defect between the vaginal and rectal walls..   Musculoskeletal: Normal range of motion.   Lymphadenopathy: No inguinal adenopathy noted on the right or left side.   Neurological: She is alert and oriented to person, place, and time.   Skin: Skin is warm and dry.   ck 1 cm scaly lesion r groin fold   Psychiatric: She has a normal mood and affect.             Medication List           Accurate as of 2/19/19  3:39 PM. If you have any questions, ask your nurse or doctor.               START taking these medications    terbinafine HCl 1 % cream  Also known as:  LamISIL AT  INSTRUCTIONS:  Apply small amount of cream to the affected area twice daily.  Started by:  Luly Martínez MD           CONTINUE taking these medications    etonogestrel-ethinyl estradiol 0.12-0.015 mg/24 hr vaginal ring  Also known as:  NUVARING  INSTRUCTIONS:  INSERT 1 RING VAGINALLY AND LEAVE IN PLACE FOR 3 WEEKS THEN REMOVE FOR 1 WEEK        levothyroxine 150 MCG tablet  Also known as:  SYNTHROID  INSTRUCTIONS:  Take 1 tablet (150 mcg total) by mouth daily.              Where to Get Your Medications      [id number] [place] [facility] [address] [facility] [address]  [address], [place] [address]    Phone:  [phone]     etonogestrel-ethinyl estradiol 0.12-0.015 mg/24 hr vaginal ring    terbinafine HCl 1 %  cream         Additional Screenings Completed Today:

## 2021-06-29 ENCOUNTER — RECORDS - HEALTHEAST (OUTPATIENT)
Dept: ADMINISTRATIVE | Facility: OTHER | Age: 31
End: 2021-06-29

## 2021-07-03 NOTE — ADDENDUM NOTE
Addendum Note by Rogelio Perez MD at 12/5/2017  1:01 PM     Author: Rogelio Perez MD Service: -- Author Type: Physician    Filed: 12/5/2017  1:01 PM Encounter Date: 12/4/2017 Status: Signed    : Rogelio Perez MD (Physician)    Addended by: ROGELIO PEREZ on: 12/5/2017 01:01 PM        Modules accepted: Orders

## 2021-07-03 NOTE — ADDENDUM NOTE
Addendum Note by Rogelio Perez MD at 5/19/2018  9:33 AM     Author: Rogelio Perez MD Service: -- Author Type: Physician    Filed: 5/19/2018  9:33 AM Encounter Date: 5/17/2018 Status: Signed    : Rogelio Perez MD (Physician)    Addended by: ROGELIO PEREZ on: 5/19/2018 09:33 AM        Modules accepted: Orders

## 2021-07-03 NOTE — ADDENDUM NOTE
Addendum Note by Cheko Martínez MD at 3/18/2019  8:26 AM     Author: Cheko Martínez MD Service: -- Author Type: Physician    Filed: 3/18/2019  8:26 AM Encounter Date: 3/7/2019 Status: Signed    : Cheko Martínez MD (Physician)    Addended by: CHEKO MARTÍNEZ on: 3/18/2019 08:26 AM        Modules accepted: Orders

## 2021-07-14 PROBLEM — Z97.5 IUD (INTRAUTERINE DEVICE) IN PLACE: Status: RESOLVED | Noted: 2017-04-18 | Resolved: 2017-05-30

## 2021-09-13 ENCOUNTER — MYC MEDICAL ADVICE (OUTPATIENT)
Dept: ENDOCRINOLOGY | Facility: CLINIC | Age: 31
End: 2021-09-13

## 2021-09-13 DIAGNOSIS — E89.0 POSTABLATIVE HYPOTHYROIDISM: ICD-10-CM

## 2021-09-13 DIAGNOSIS — Z33.1 PREGNANCY, INCIDENTAL: Primary | ICD-10-CM

## 2021-09-13 RX ORDER — LEVOTHYROXINE SODIUM 175 UG/1
175 TABLET ORAL DAILY
Qty: 90 TABLET | Refills: 1 | Status: SHIPPED | OUTPATIENT
Start: 2021-09-13 | End: 2021-12-15

## 2021-09-13 NOTE — TELEPHONE ENCOUNTER
4 weeks pregnant.   Welcome surprise.   On levothyroxine 125 mcg daily. TSH 10.68 per report.   Increase levothyroxine by 30% as follows.   Plan: increase levothyroxine to 175 mcg daily six days a week and half a tablet one day a week. New prescription sent to pharmacy. Check free T4 7 days after the adjustment. Further plan based on the results. Levothyroxine should be taken on empty stomach and wait at least 30 minutes before eating. Don't take supplements or multivitamins containing iron and calcium within 4 hours of taking levothyroxine tablet.     Called and discussed the above with the patient.

## 2021-10-10 ENCOUNTER — HEALTH MAINTENANCE LETTER (OUTPATIENT)
Age: 31
End: 2021-10-10

## 2021-11-05 ENCOUNTER — IMMUNIZATION (OUTPATIENT)
Dept: NURSING | Facility: CLINIC | Age: 31
End: 2021-11-05
Payer: COMMERCIAL

## 2021-11-05 PROCEDURE — 0004A PR COVID VAC PFIZER DIL RECON 30 MCG/0.3 ML IM: CPT

## 2021-11-05 PROCEDURE — 91300 PR COVID VAC PFIZER DIL RECON 30 MCG/0.3 ML IM: CPT

## 2021-12-14 ENCOUNTER — MYC MEDICAL ADVICE (OUTPATIENT)
Dept: ENDOCRINOLOGY | Facility: CLINIC | Age: 31
End: 2021-12-14
Payer: COMMERCIAL

## 2021-12-15 DIAGNOSIS — E89.0 POSTABLATIVE HYPOTHYROIDISM: ICD-10-CM

## 2021-12-15 RX ORDER — LEVOTHYROXINE SODIUM 175 UG/1
TABLET ORAL
Qty: 90 TABLET | Refills: 1 | Status: SHIPPED | OUTPATIENT
Start: 2021-12-15 | End: 2022-03-09

## 2021-12-15 NOTE — TELEPHONE ENCOUNTER
"\"Christopher Kumari,    Thank you for the update. Are you currently taking 1 tablet (175 mcg) by mouth daily Six days a week and half a tablet 1 day a week? If you are on this dose; please change to 1 tablet (175 mcg) daily 7 days a week. Continue to check blood work every 4 weeks. Please let me know if any questions.     Yogi Edward MD\"    "

## 2021-12-15 NOTE — TELEPHONE ENCOUNTER
I reached patieint by phone and she was taking it as prescribed so will now start 1 tablet daily 7 days a week. I routed the updated order to you .

## 2021-12-15 NOTE — TELEPHONE ENCOUNTER
Christopher Edward,     Could you please review this patient's lab work? I have her scheduled for the first available appointment but she asked for a review as well.    Thank you,  Khadra ZAVALA

## 2022-01-30 ENCOUNTER — HEALTH MAINTENANCE LETTER (OUTPATIENT)
Age: 32
End: 2022-01-30

## 2022-03-09 ENCOUNTER — VIRTUAL VISIT (OUTPATIENT)
Dept: ENDOCRINOLOGY | Facility: CLINIC | Age: 32
End: 2022-03-09
Payer: COMMERCIAL

## 2022-03-09 DIAGNOSIS — Z3A.30 30 WEEKS GESTATION OF PREGNANCY: ICD-10-CM

## 2022-03-09 DIAGNOSIS — E89.0 POSTABLATIVE HYPOTHYROIDISM: Primary | ICD-10-CM

## 2022-03-09 PROCEDURE — 99213 OFFICE O/P EST LOW 20 MIN: CPT | Mod: GT | Performed by: INTERNAL MEDICINE

## 2022-03-09 RX ORDER — LEVOTHYROXINE SODIUM 175 UG/1
TABLET ORAL
Qty: 90 TABLET | Refills: 3 | Status: SHIPPED | OUTPATIENT
Start: 2022-03-09 | End: 2022-04-15

## 2022-03-09 ASSESSMENT — ENCOUNTER SYMPTOMS
HOARSE VOICE: 0
INCREASED ENERGY: 1
NIGHT SWEATS: 1
CHILLS: 0
POLYPHAGIA: 0
DECREASED APPETITE: 1
SMELL DISTURBANCE: 0
TASTE DISTURBANCE: 0
POLYDIPSIA: 0
HALLUCINATIONS: 0
TROUBLE SWALLOWING: 0
SINUS CONGESTION: 1
NECK MASS: 0
SORE THROAT: 0
ALTERED TEMPERATURE REGULATION: 0
SINUS PAIN: 0
WEIGHT GAIN: 1
FATIGUE: 1
WEIGHT LOSS: 0
FEVER: 0

## 2022-03-09 NOTE — LETTER
3/9/2022       RE: Kenyetta Cordero  [address]  [place] [address]     Dear Colleague,    Thank you for referring your patient, Kenyetta Cordero, to the [facility] [place] at [facility]. Please see a copy of my visit note below.    Kenyetta is a 31 year old who is being evaluated via a billable video visit.      How would you like to obtain your AVS? MyChart  If the video visit is dropped, the invitation should be resent by: Send to e-mail at: [email or web address]  Will anyone else be joining your video visit? No          [facility] Video follow up  Endocrinology  Yogi Edward MD  March 9, 2022       Chief Complaint:   Hypothyroidism follow up while pregnant    30 weeks   History of Present Illness:   Kenyetta Cordero is a 31 year old female with a history of hypothyroidism secondary to radio-iodine ablation.  Currently pregnant at 30 weeks.   She was diagnosed with Grave's disease in 2014 and had ONEAL (20.1 mCi of iodine-131) on 01/05/2018.  Hypothyroidism following that.     No tremor or palpitation,other ROS below. Feels tired attributing to pregnancy.   She is currently on 175 mcg daily of levothyroxine.     Review of Systems:   Answers for HPI/ROS submitted by the patient on 3/9/2022  General Symptoms: Yes  Skin Symptoms: No  HENT Symptoms: Yes  EYE SYMPTOMS: No  HEART SYMPTOMS: No  LUNG SYMPTOMS: No  INTESTINAL SYMPTOMS: No  URINARY SYMPTOMS: No  GYNECOLOGIC SYMPTOMS: No  BREAST SYMPTOMS: No  SKELETAL SYMPTOMS: No  BLOOD SYMPTOMS: No  NERVOUS SYSTEM SYMPTOMS: No  MENTAL HEALTH SYMPTOMS: No  Ear pain: No  Ear discharge: No  Hearing loss: No  Tinnitus: No  Nosebleeds: No  Congestion: Yes  Sinus pain: No  Trouble swallowing: No   Voice hoarseness: No  Mouth sores: No  Sore throat: No  Tooth pain: No  Gum tenderness: No  Bleeding gums: No  Change in taste: No  Change in sense of smell: No  Dry mouth:  Yes  Hearing aid used: No  Neck lump: No  Fever: No  Loss of appetite: Yes  Weight loss: No  Weight gain: Yes  Fatigue: Yes  Night sweats: Yes  Chills: No  Increased stress: No  Excessive hunger: No  Excessive thirst: No  Feeling hot or cold when others believe the temperature is normal: No  Loss of height: No  Post-operative complications: No  Surgical site pain: No  Hallucinations: No  Change in or Loss of Energy: Yes  Hyperactivity: No  Confusion: No        Active Medications:       Current Outpatient Medications:      cholecalciferol (VITAMIN D3) 5000 units (125 mcg) capsule, Take 5,000 Units by mouth daily, Disp: , Rfl:      levothyroxine (SYNTHROID/LEVOTHROID) 175 MCG tablet, Take 1 tablet daily by mouth, Disp: 90 tablet, Rfl: 1     Prenatal Vit-Fe Fumarate-FA (PRENATAL PO), , Disp: , Rfl:    Allergies:      Allergies   Allergen Reactions     Birch [Betula Alba Oil] Unknown     Other Environmental Allergy Unknown     Seasonal allergies.        Past Medical History:  History of Grave's disease   Hypothyroidism  Vitamin D deficiency   Anxiety and postpartum depression    Past Surgical History:  History reviewed. No pertinent surgical history.    Family History:   History reviewed. No pertinent family history.      Social History:   Smoking Status: never   Smokeless Tobacco: never    Marital Status:    Employment status: works in childcare      Physical Exam:   Constitutional: Pleasant no acute cardiopulmonary distress.   Neurological: Alert and oriented.   Psychological: appropriate mood and affect     Data:     Ref Range & Units 7 d ago   Glucose, 1 Hour OB Challenge 70 - 135 mg/dL 116         Ref Range & Units 7 d ago   Hemoglobin 12.0 - 15.5 g/dL 12.1        Ref Range & Units 7 d ago    TSH, Pregnancy with Reflex 0.30 - 2.50 uIU/mL 0.40         Ref Range & Units 7 d ago Comments   Thyroid Stimulating Immunoglobulins <=0.54 IU/L <0.10           Assessment and Plan:  Postablative hypothyroidism    Currently pregnant at 30 weeks   Current weight 175 pounds  Current dose of levothyroxine 175 mcg daily seems appropriate based on the TSH which was normal on 3/2/22.  Continue the same dose. After delivery reduce the dose as follows. Levothyroxine should be taken on empty stomach and wait at least 30 minutes before eating. Don't take supplements or multivitamins containing iron and calcium within 4 hours of taking levothyroxine tablet.       Fatigue: Hgb stable. TSH normal. Likely from pregnancy. Follow up with Ob/Gyn.       Patient Instructions   Kenyetta.      Levothyroxine 175 mcg daily.  Take it first thing in the morning and wait at least 30 minutes before eating. Continue to separate iron or calcium-containing supplements at least 4 hours from levothyroxine.     After delivery reduce the dose of levothyroxine to 175 mcg daily SIX DAYS A WEEK.             Follow-up: 12 months.          Yogi Edward MD  Staff Endocrinologist   Endocrinology and Metabolism  [facility]    Video-Visit Details  1st VideoStart: 03/09/2022 12:02 pm  Stop: 03/09/2022 12:11 pm

## 2022-03-09 NOTE — PROGRESS NOTES
Health Video follow up  Endocrinology  Yogi Edward MD  March 9, 2022       Chief Complaint:   Hypothyroidism follow up while pregnant    30 weeks   History of Present Illness:   Kenyetta Cordero is a 31 year old female with a history of hypothyroidism secondary to radio-iodine ablation.  Currently pregnant at 30 weeks.   She was diagnosed with Grave's disease in 2014 and had ONEAL (20.1 mCi of iodine-131) on 01/05/2018.  Hypothyroidism following that.     No tremor or palpitation,other ROS below. Feels tired attributing to pregnancy.   She is currently on 175 mcg daily of levothyroxine.     Review of Systems:   Answers for HPI/ROS submitted by the patient on 3/9/2022  General Symptoms: Yes  Skin Symptoms: No  HENT Symptoms: Yes  EYE SYMPTOMS: No  HEART SYMPTOMS: No  LUNG SYMPTOMS: No  INTESTINAL SYMPTOMS: No  URINARY SYMPTOMS: No  GYNECOLOGIC SYMPTOMS: No  BREAST SYMPTOMS: No  SKELETAL SYMPTOMS: No  BLOOD SYMPTOMS: No  NERVOUS SYSTEM SYMPTOMS: No  MENTAL HEALTH SYMPTOMS: No  Ear pain: No  Ear discharge: No  Hearing loss: No  Tinnitus: No  Nosebleeds: No  Congestion: Yes  Sinus pain: No  Trouble swallowing: No   Voice hoarseness: No  Mouth sores: No  Sore throat: No  Tooth pain: No  Gum tenderness: No  Bleeding gums: No  Change in taste: No  Change in sense of smell: No  Dry mouth: Yes  Hearing aid used: No  Neck lump: No  Fever: No  Loss of appetite: Yes  Weight loss: No  Weight gain: Yes  Fatigue: Yes  Night sweats: Yes  Chills: No  Increased stress: No  Excessive hunger: No  Excessive thirst: No  Feeling hot or cold when others believe the temperature is normal: No  Loss of height: No  Post-operative complications: No  Surgical site pain: No  Hallucinations: No  Change in or Loss of Energy: Yes  Hyperactivity: No  Confusion: No        Active Medications:       Current Outpatient Medications:      cholecalciferol (VITAMIN D3) 5000 units (125 mcg) capsule, Take 5,000 Units by mouth daily, Disp: , Rfl:       levothyroxine (SYNTHROID/LEVOTHROID) 175 MCG tablet, Take 1 tablet daily by mouth, Disp: 90 tablet, Rfl: 1     Prenatal Vit-Fe Fumarate-FA (PRENATAL PO), , Disp: , Rfl:    Allergies:      Allergies   Allergen Reactions     Birch [Betula Alba Oil] Unknown     Other Environmental Allergy Unknown     Seasonal allergies.        Past Medical History:  History of Grave's disease   Hypothyroidism  Vitamin D deficiency   Anxiety and postpartum depression    Past Surgical History:  History reviewed. No pertinent surgical history.    Family History:   History reviewed. No pertinent family history.      Social History:   Smoking Status: never   Smokeless Tobacco: never    Marital Status:    Employment status: works in childcare      Physical Exam:   Constitutional: Pleasant no acute cardiopulmonary distress.   Neurological: Alert and oriented.   Psychological: appropriate mood and affect     Data:     Ref Range & Units 7 d ago   Glucose, 1 Hour OB Challenge 70 - 135 mg/dL 116         Ref Range & Units 7 d ago   Hemoglobin 12.0 - 15.5 g/dL 12.1        Ref Range & Units 7 d ago    TSH, Pregnancy with Reflex 0.30 - 2.50 uIU/mL 0.40         Ref Range & Units 7 d ago Comments   Thyroid Stimulating Immunoglobulins <=0.54 IU/L <0.10           Assessment and Plan:  Postablative hypothyroidism   Currently pregnant at 30 weeks   Current weight 175 pounds  Current dose of levothyroxine 175 mcg daily seems appropriate based on the TSH which was normal on 3/2/22.  Continue the same dose. After delivery reduce the dose as follows. Levothyroxine should be taken on empty stomach and wait at least 30 minutes before eating. Don't take supplements or multivitamins containing iron and calcium within 4 hours of taking levothyroxine tablet.       Fatigue: Hgb stable. TSH normal. Likely from pregnancy. Follow up with Ob/Gyn.       Patient Instructions   Kenyetta.      Levothyroxine 175 mcg daily.  Take it first thing in the morning and wait  at least 30 minutes before eating. Continue to separate iron or calcium-containing supplements at least 4 hours from levothyroxine.     After delivery reduce the dose of levothyroxine to 175 mcg daily SIX DAYS A WEEK.             Follow-up: 12 months.          Yogi Edward MD  Staff Endocrinologist   Endocrinology and Metabolism  [facility]    Video-Visit Details  1st VideoStart: 03/09/2022 12:02 pm  Stop: 03/09/2022 12:11 pm

## 2022-03-09 NOTE — PROGRESS NOTES
Kenyetta is a 31 year old who is being evaluated via a billable video visit.      How would you like to obtain your AVS? MyChart  If the video visit is dropped, the invitation should be resent by: Send to e-mail at: [email or web address]  Will anyone else be joining your video visit? No

## 2022-03-09 NOTE — PATIENT INSTRUCTIONS
Kenyetta.      Levothyroxine 175 mcg daily.  Take it first thing in the morning and wait at least 30 minutes before eating. Continue to separate iron or calcium-containing supplements at least 4 hours from levothyroxine.     After delivery reduce the dose of levothyroxine to 175 mcg daily SIX DAYS A WEEK.

## 2022-03-09 NOTE — NURSING NOTE
Patient denies any changes since echeck-in regarding medication and allergies and states all information entered during echeck-in remains accurate.  Taylor Myhre, VF

## 2022-04-15 DIAGNOSIS — E89.0 POSTABLATIVE HYPOTHYROIDISM: ICD-10-CM

## 2022-04-15 RX ORDER — LEVOTHYROXINE SODIUM 175 UG/1
175 TABLET ORAL DAILY
Qty: 90 TABLET | Refills: 3 | Status: SHIPPED | OUTPATIENT
Start: 2022-04-15 | End: 2023-12-06

## 2022-06-27 ENCOUNTER — MEDICAL CORRESPONDENCE (OUTPATIENT)
Dept: HEALTH INFORMATION MANAGEMENT | Facility: CLINIC | Age: 32
End: 2022-06-27

## 2022-07-18 ENCOUNTER — MEDICAL CORRESPONDENCE (OUTPATIENT)
Dept: HEALTH INFORMATION MANAGEMENT | Facility: CLINIC | Age: 32
End: 2022-07-18

## 2022-09-14 ENCOUNTER — MEDICAL CORRESPONDENCE (OUTPATIENT)
Dept: HEALTH INFORMATION MANAGEMENT | Facility: CLINIC | Age: 32
End: 2022-09-14

## 2022-09-14 ENCOUNTER — ALLIED HEALTH/NURSE VISIT (OUTPATIENT)
Dept: FAMILY MEDICINE | Facility: CLINIC | Age: 32
End: 2022-09-14
Payer: COMMERCIAL

## 2022-09-14 DIAGNOSIS — Z23 NEED FOR VACCINATION: Primary | ICD-10-CM

## 2022-09-14 PROCEDURE — 99207 PR NO CHARGE NURSE ONLY: CPT

## 2022-09-14 PROCEDURE — 90686 IIV4 VACC NO PRSV 0.5 ML IM: CPT

## 2022-09-14 PROCEDURE — 90471 IMMUNIZATION ADMIN: CPT

## 2022-12-07 ENCOUNTER — MEDICAL CORRESPONDENCE (OUTPATIENT)
Dept: HEALTH INFORMATION MANAGEMENT | Facility: CLINIC | Age: 32
End: 2022-12-07

## 2023-03-04 NOTE — TELEPHONE ENCOUNTER
Message left for pharmacy, with below information  
RN Triage:   pnv #6-iron-FA-B12-calcium-D3 35 mg(d)/1.1 mg -600 unit (n) TbSQ    Can pharmacy substitute the prenatal vitamin?  They do not have this specific prenatal vitamin as it was ordered.   pnv #6-iron-FA-B12-calcium-D3 35 mg(d)/1.1 mg -600 unit (n) TbSQ      Please advise and call the pharmacy to ok the change.    [facility] in [place]        Reason for Disposition    Caller has NON-URGENT medication question about med that PCP prescribed and triager unable to answer question    Protocols used: MEDICATION QUESTION CALL-A-AH      
That's fine.  
stairs to enter home

## 2023-05-08 ENCOUNTER — HEALTH MAINTENANCE LETTER (OUTPATIENT)
Age: 33
End: 2023-05-08

## 2023-09-20 ENCOUNTER — TRANSFERRED RECORDS (OUTPATIENT)
Dept: HEALTH INFORMATION MANAGEMENT | Facility: CLINIC | Age: 33
End: 2023-09-20
Payer: COMMERCIAL

## 2023-12-01 DIAGNOSIS — E89.0 POSTABLATIVE HYPOTHYROIDISM: ICD-10-CM

## 2023-12-06 ENCOUNTER — TELEPHONE (OUTPATIENT)
Dept: ENDOCRINOLOGY | Facility: CLINIC | Age: 33
End: 2023-12-06
Payer: COMMERCIAL

## 2023-12-06 RX ORDER — LEVOTHYROXINE SODIUM 175 UG/1
TABLET ORAL
Qty: 15 TABLET | Refills: 0 | Status: SHIPPED | OUTPATIENT
Start: 2023-12-06

## 2023-12-06 NOTE — TELEPHONE ENCOUNTER
LEVOTHYROXINE 0.175MG (175MCG) TABS      Last Written Prescription Date:  4/15/22  Last Fill Quantity: 90,   # refills: 3  Last Office Visit : 3/9/22  Future Office visit:  none    Routing refill request to provider for review/approval because:  Overdue TSH, Care Everywhere checked for most recent labs  Outside lab, 4/11/22  TSH, Pregnancy with Reflex  0.3 - 2.5 uIU/mL 0.15 Low      Overdue for appointment  Schedulers notified

## 2023-12-06 NOTE — TELEPHONE ENCOUNTER
Patient call:     Appointment type: RETURN DIABETES   Provider: DR. BROWNE   Return date:SCHEDULE FIRST AVAILABLE FOR RX   Speciality phone number: [phone]  Additional appointment(s) needed:   Additional notes:  LVM AND SENT XAVIER Franks on 12/6/2023 at 11:17 AM     THIS APPT ALSO FOR THEIR RX

## 2023-12-07 DIAGNOSIS — E89.0 POSTABLATIVE HYPOTHYROIDISM: ICD-10-CM

## 2023-12-11 ENCOUNTER — TELEPHONE (OUTPATIENT)
Dept: ENDOCRINOLOGY | Facility: CLINIC | Age: 33
End: 2023-12-11
Payer: COMMERCIAL

## 2023-12-11 NOTE — TELEPHONE ENCOUNTER
Patient call:      Appointment type: RETURN DIABETES   Provider: DR. BROWNE   Return date:SCHEDULE FIRST AVAILABLE FOR RX   Speciality phone number: [phone]  Additional appointment(s) needed:   Additional notes:  LVM AND SENT MYC RJ Davis on 12/11/2023 at 3:00 PM       THIS APPT ALSO FOR THEIR RX

## 2024-07-14 ENCOUNTER — HEALTH MAINTENANCE LETTER (OUTPATIENT)
Age: 34
End: 2024-07-14

## 2024-10-29 ENCOUNTER — IMMUNIZATION (OUTPATIENT)
Dept: FAMILY MEDICINE | Facility: CLINIC | Age: 34
End: 2024-10-29
Payer: COMMERCIAL

## 2024-10-29 DIAGNOSIS — Z23 ENCOUNTER FOR IMMUNIZATION: Primary | ICD-10-CM

## 2024-10-29 PROCEDURE — 90480 ADMN SARSCOV2 VAC 1/ONLY CMP: CPT

## 2024-10-29 PROCEDURE — 99207 PR NO CHARGE NURSE ONLY: CPT

## 2024-10-29 PROCEDURE — 91320 SARSCV2 VAC 30MCG TRS-SUC IM: CPT

## 2024-10-29 PROCEDURE — 90471 IMMUNIZATION ADMIN: CPT

## 2024-10-29 PROCEDURE — 90656 IIV3 VACC NO PRSV 0.5 ML IM: CPT

## 2024-10-29 NOTE — PROGRESS NOTES
Prior to immunization administration, verified patients identity using patient s name and date of birth. Please see Immunization Activity for additional information.     Is the patient's temperature normal (100.5 or less)? Yes     Patient MEETS CRITERIA. PROCEED with vaccine administration.      Patient instructed to remain in clinic for 15 minutes afterwards, and to report any adverse reactions.      Link to Ancillary Visit Immunization Standing Orders SmartSet     Screening performed by Nataly Traylor MA on 10/29/2024 at 9:02 AM.        Prior to immunization administration, verified patients identity using patient s name and date of birth. Please see Immunization Activity for additional information.     Is the patient's temperature normal (100.5 or less)? Yes     Patient MEETS CRITERIA. PROCEED with vaccine administration.      Patient instructed to remain in clinic for 15 minutes afterwards, and to report any adverse reactions.      Link to Ancillary Visit Immunization Standing Orders SmartSet     Screening performed by Nataly Traylor MA on 10/29/2024 at 9:03 AM.

## 2025-05-13 ENCOUNTER — TRANSFERRED RECORDS (OUTPATIENT)
Dept: MULTI SPECIALTY CLINIC | Facility: CLINIC | Age: 35
End: 2025-05-13

## 2025-05-13 LAB — PAP SMEAR - HIM PATIENT REPORTED: NORMAL

## 2025-07-11 PROBLEM — N64.4 NIPPLE PAIN: Status: RESOLVED | Noted: 2024-02-05 | Resolved: 2025-07-11

## 2025-07-11 PROBLEM — M62.89 HIGH-TONE PELVIC FLOOR DYSFUNCTION: Status: ACTIVE | Noted: 2022-03-20

## 2025-07-22 NOTE — ASSESSMENT & PLAN NOTE
Group Topic: BH Check-in/Symptom Rating    Date: 11/6/2020  Start Time: 2000  End Time: 2100  Facilitators: ELIAS Sorensen    Focus: Check In (Individual)  Number in attendance: 9    Method: Individual  Patient Evaluation: Invited - refused to attend   January 2019 her levothyroxine was increased from 125 mcg/day to 150 mcg/day.  A TSH should be rechecked today and medication titration should be based on results.   69

## 2025-08-21 DIAGNOSIS — F41.1 GAD (GENERALIZED ANXIETY DISORDER): ICD-10-CM

## 2025-08-21 RX ORDER — SERTRALINE HYDROCHLORIDE 100 MG/1
100 TABLET, FILM COATED ORAL DAILY
Qty: 90 TABLET | Refills: 1 | Status: SHIPPED | OUTPATIENT
Start: 2025-08-21